# Patient Record
Sex: FEMALE | Race: WHITE | Employment: OTHER | URBAN - METROPOLITAN AREA
[De-identification: names, ages, dates, MRNs, and addresses within clinical notes are randomized per-mention and may not be internally consistent; named-entity substitution may affect disease eponyms.]

---

## 2017-01-01 ENCOUNTER — HOSPITAL ENCOUNTER (OUTPATIENT)
Dept: OTHER | Age: 77
Discharge: HOME | End: 2017-01-31
Attending: INTERNAL MEDICINE | Admitting: INTERNAL MEDICINE

## 2017-01-03 ENCOUNTER — TELEPHONE (OUTPATIENT)
Dept: CARDIOLOGY CLINIC | Age: 77
End: 2017-01-03

## 2017-01-03 ENCOUNTER — PROCEDURE VISIT (OUTPATIENT)
Dept: CARDIOLOGY CLINIC | Age: 77
End: 2017-01-03

## 2017-01-03 DIAGNOSIS — Z95.0 CARDIAC PACEMAKER IN SITU: Primary | ICD-10-CM

## 2017-01-03 PROCEDURE — 93296 REM INTERROG EVL PM/IDS: CPT | Performed by: INTERNAL MEDICINE

## 2017-01-03 PROCEDURE — 93294 REM INTERROG EVL PM/LDLS PM: CPT | Performed by: INTERNAL MEDICINE

## 2017-01-09 LAB
POC INR: 2.7 INDEX
PROTHROMBIN TIME, POC: 32.3 SECONDS (ref 10–14.3)

## 2017-02-01 ENCOUNTER — HOSPITAL ENCOUNTER (OUTPATIENT)
Dept: OTHER | Age: 77
Discharge: HOME | End: 2017-02-28
Attending: INTERNAL MEDICINE | Admitting: INTERNAL MEDICINE

## 2017-02-16 LAB
POC INR: 2.6 INDEX
PROTHROMBIN TIME, POC: 31.4 SECONDS (ref 10–14.3)

## 2017-03-01 ENCOUNTER — HOSPITAL ENCOUNTER (OUTPATIENT)
Dept: OTHER | Age: 77
Discharge: HOME | End: 2017-03-31
Attending: INTERNAL MEDICINE | Admitting: INTERNAL MEDICINE

## 2017-03-14 ENCOUNTER — TELEPHONE (OUTPATIENT)
Dept: CARDIOLOGY CLINIC | Age: 77
End: 2017-03-14

## 2017-03-22 RX ORDER — DIGOXIN 125 MCG
125 TABLET ORAL DAILY
Qty: 90 TABLET | Refills: 3 | Status: SHIPPED | OUTPATIENT
Start: 2017-03-22 | End: 2018-03-21 | Stop reason: SDUPTHER

## 2017-03-27 LAB
POC INR: 2.4 INDEX
PROTHROMBIN TIME, POC: 28.3 SECONDS (ref 10–14.3)

## 2017-03-29 ENCOUNTER — OFFICE VISIT (OUTPATIENT)
Dept: CARDIOLOGY CLINIC | Age: 77
End: 2017-03-29

## 2017-03-29 VITALS
BODY MASS INDEX: 19.19 KG/M2 | WEIGHT: 115.2 LBS | HEART RATE: 66 BPM | HEIGHT: 65 IN | SYSTOLIC BLOOD PRESSURE: 128 MMHG | DIASTOLIC BLOOD PRESSURE: 62 MMHG

## 2017-03-29 DIAGNOSIS — E78.5 HYPERLIPIDEMIA, UNSPECIFIED HYPERLIPIDEMIA TYPE: ICD-10-CM

## 2017-03-29 DIAGNOSIS — Z72.0 TOBACCO ABUSE: ICD-10-CM

## 2017-03-29 DIAGNOSIS — Z95.0 CARDIAC PACEMAKER IN SITU: ICD-10-CM

## 2017-03-29 DIAGNOSIS — I48.0 PAF (PAROXYSMAL ATRIAL FIBRILLATION) (HCC): ICD-10-CM

## 2017-03-29 DIAGNOSIS — I10 ESSENTIAL HYPERTENSION: ICD-10-CM

## 2017-03-29 DIAGNOSIS — I38 VHD (VALVULAR HEART DISEASE): Primary | ICD-10-CM

## 2017-03-29 PROCEDURE — 99214 OFFICE O/P EST MOD 30 MIN: CPT | Performed by: INTERNAL MEDICINE

## 2017-04-01 ENCOUNTER — HOSPITAL ENCOUNTER (OUTPATIENT)
Dept: OTHER | Age: 77
Discharge: HOME | End: 2017-04-30
Attending: INTERNAL MEDICINE | Admitting: INTERNAL MEDICINE

## 2017-04-04 ENCOUNTER — PROCEDURE VISIT (OUTPATIENT)
Dept: CARDIOLOGY CLINIC | Age: 77
End: 2017-04-04

## 2017-04-04 DIAGNOSIS — E78.5 HYPERLIPIDEMIA, UNSPECIFIED HYPERLIPIDEMIA TYPE: ICD-10-CM

## 2017-04-04 DIAGNOSIS — I48.0 PAF (PAROXYSMAL ATRIAL FIBRILLATION) (HCC): Primary | ICD-10-CM

## 2017-04-04 DIAGNOSIS — Z95.0 CARDIAC PACEMAKER IN SITU: ICD-10-CM

## 2017-04-04 DIAGNOSIS — I10 ESSENTIAL HYPERTENSION: ICD-10-CM

## 2017-04-04 DIAGNOSIS — Z72.0 TOBACCO ABUSE: ICD-10-CM

## 2017-04-04 DIAGNOSIS — I38 VHD (VALVULAR HEART DISEASE): ICD-10-CM

## 2017-04-04 LAB
LV EF: 53 %
LVEF MODALITY: NORMAL

## 2017-04-04 PROCEDURE — 93306 TTE W/DOPPLER COMPLETE: CPT | Performed by: INTERNAL MEDICINE

## 2017-04-07 ENCOUNTER — TELEPHONE (OUTPATIENT)
Dept: CARDIOLOGY CLINIC | Age: 77
End: 2017-04-07

## 2017-04-11 ENCOUNTER — PROCEDURE VISIT (OUTPATIENT)
Dept: CARDIOLOGY CLINIC | Age: 77
End: 2017-04-11

## 2017-04-11 DIAGNOSIS — Z95.0 CARDIAC PACEMAKER IN SITU: Primary | ICD-10-CM

## 2017-04-11 PROCEDURE — 93296 REM INTERROG EVL PM/IDS: CPT | Performed by: INTERNAL MEDICINE

## 2017-04-11 PROCEDURE — 93294 REM INTERROG EVL PM/LDLS PM: CPT | Performed by: INTERNAL MEDICINE

## 2017-04-17 RX ORDER — METOPROLOL TARTRATE 50 MG/1
50 TABLET, FILM COATED ORAL 2 TIMES DAILY
Qty: 180 TABLET | Refills: 3 | Status: SHIPPED | OUTPATIENT
Start: 2017-04-17 | End: 2018-04-23 | Stop reason: SDUPTHER

## 2017-04-27 LAB
POC INR: 2.8 INDEX
PROTHROMBIN TIME, POC: 33.5 SECONDS (ref 10–14.3)

## 2017-05-01 ENCOUNTER — HOSPITAL ENCOUNTER (OUTPATIENT)
Dept: OTHER | Age: 77
Discharge: HOME | End: 2017-05-31
Attending: INTERNAL MEDICINE | Admitting: INTERNAL MEDICINE

## 2017-05-25 LAB
POC INR: 2.6 INDEX
PROTHROMBIN TIME, POC: 31.3 SECONDS (ref 10–14.3)

## 2017-06-01 ENCOUNTER — HOSPITAL ENCOUNTER (OUTPATIENT)
Dept: OTHER | Age: 77
Discharge: HOME | End: 2017-06-30
Attending: INTERNAL MEDICINE | Admitting: INTERNAL MEDICINE

## 2017-06-22 LAB
POC INR: 2.4 INDEX
PROTHROMBIN TIME, POC: 28.2 SECONDS (ref 10–14.3)

## 2017-07-01 ENCOUNTER — HOSPITAL ENCOUNTER (OUTPATIENT)
Dept: OTHER | Age: 77
Discharge: HOME | End: 2017-07-31
Attending: INTERNAL MEDICINE | Admitting: INTERNAL MEDICINE

## 2017-07-17 ENCOUNTER — TELEPHONE (OUTPATIENT)
Dept: CARDIOLOGY CLINIC | Age: 77
End: 2017-07-17

## 2017-07-18 ENCOUNTER — PROCEDURE VISIT (OUTPATIENT)
Dept: CARDIOLOGY CLINIC | Age: 77
End: 2017-07-18

## 2017-07-18 DIAGNOSIS — Z95.0 CARDIAC PACEMAKER IN SITU: Primary | ICD-10-CM

## 2017-07-18 PROCEDURE — 93296 REM INTERROG EVL PM/IDS: CPT | Performed by: INTERNAL MEDICINE

## 2017-07-18 PROCEDURE — 93294 REM INTERROG EVL PM/LDLS PM: CPT | Performed by: INTERNAL MEDICINE

## 2017-07-20 LAB
POC INR: 2.2 INDEX
PROTHROMBIN TIME, POC: 26.5 SECONDS (ref 10–14.3)

## 2017-08-01 ENCOUNTER — HOSPITAL ENCOUNTER (OUTPATIENT)
Dept: OTHER | Age: 77
Discharge: HOME | End: 2017-08-31
Attending: INTERNAL MEDICINE | Admitting: INTERNAL MEDICINE

## 2017-08-17 LAB
POC INR: 3.2 INDEX
PROTHROMBIN TIME, POC: 38 SECONDS (ref 10–14.3)

## 2017-09-01 ENCOUNTER — HOSPITAL ENCOUNTER (OUTPATIENT)
Dept: OTHER | Age: 77
Discharge: HOME | End: 2017-09-30
Attending: INTERNAL MEDICINE | Admitting: INTERNAL MEDICINE

## 2017-09-14 LAB
POC INR: 3.4 INDEX
PROTHROMBIN TIME, POC: 41.1 SECONDS (ref 10–14.3)

## 2017-09-18 ENCOUNTER — HOSPITAL ENCOUNTER (OUTPATIENT)
Dept: GENERAL RADIOLOGY | Age: 77
Discharge: HOME | End: 2017-09-18
Attending: INTERNAL MEDICINE | Admitting: INTERNAL MEDICINE

## 2017-09-18 LAB
CHOLESTEROL: 204 MG/DL
HDLC SERPL-MCNC: 48 MG/DL
LDL CHOLESTEROL DIRECT: 140 MG/DL
TRIGL SERPL-MCNC: 94 MG/DL

## 2017-09-27 ENCOUNTER — OFFICE VISIT (OUTPATIENT)
Dept: CARDIOLOGY CLINIC | Age: 77
End: 2017-09-27

## 2017-09-27 VITALS
OXYGEN SATURATION: 98 % | HEIGHT: 65 IN | BODY MASS INDEX: 18.49 KG/M2 | SYSTOLIC BLOOD PRESSURE: 156 MMHG | WEIGHT: 111 LBS | HEART RATE: 70 BPM | DIASTOLIC BLOOD PRESSURE: 74 MMHG

## 2017-09-27 DIAGNOSIS — Z95.0 CARDIAC PACEMAKER IN SITU: Primary | ICD-10-CM

## 2017-09-27 DIAGNOSIS — Z78.9 STATIN INTOLERANCE: ICD-10-CM

## 2017-09-27 DIAGNOSIS — I38 VHD (VALVULAR HEART DISEASE): ICD-10-CM

## 2017-09-27 DIAGNOSIS — I48.0 PAF (PAROXYSMAL ATRIAL FIBRILLATION) (HCC): ICD-10-CM

## 2017-09-27 DIAGNOSIS — I10 ESSENTIAL HYPERTENSION: ICD-10-CM

## 2017-09-27 DIAGNOSIS — Z72.0 TOBACCO ABUSE: ICD-10-CM

## 2017-09-27 DIAGNOSIS — E78.5 HYPERLIPIDEMIA, UNSPECIFIED HYPERLIPIDEMIA TYPE: ICD-10-CM

## 2017-09-27 PROCEDURE — 99214 OFFICE O/P EST MOD 30 MIN: CPT | Performed by: INTERNAL MEDICINE

## 2017-09-27 RX ORDER — LISINOPRIL 20 MG/1
20 TABLET ORAL DAILY
Qty: 30 TABLET | Refills: 3 | Status: SHIPPED | OUTPATIENT
Start: 2017-09-27 | End: 2018-03-28 | Stop reason: DRUGHIGH

## 2017-10-01 ENCOUNTER — HOSPITAL ENCOUNTER (OUTPATIENT)
Dept: OTHER | Age: 77
Discharge: HOME | End: 2017-10-31
Attending: INTERNAL MEDICINE | Admitting: INTERNAL MEDICINE

## 2017-10-05 ENCOUNTER — HOSPITAL ENCOUNTER (OUTPATIENT)
Dept: GENERAL RADIOLOGY | Age: 77
Discharge: HOME | End: 2017-10-05
Attending: INTERNAL MEDICINE | Admitting: INTERNAL MEDICINE

## 2017-10-05 LAB
ALBUMIN SERPL-MCNC: 4.5 GM/DL (ref 3.4–5)
ALP BLD-CCNC: 75 IU/L (ref 40–129)
ALT SERPL-CCNC: 11 U/L (ref 10–40)
ANION GAP SERPL CALCULATED.3IONS-SCNC: 15 MMOL/L (ref 4–16)
AST SERPL-CCNC: 18 IU/L (ref 15–37)
BILIRUB SERPL-MCNC: 2 MG/DL (ref 0–1)
BUN BLDV-MCNC: 13 MG/DL (ref 6–23)
CALCIUM SERPL-MCNC: 9.6 MG/DL (ref 8.3–10.6)
CHLORIDE BLD-SCNC: 98 MMOL/L (ref 99–110)
CO2: 26 MMOL/L (ref 21–32)
CREAT SERPL-MCNC: 1 MG/DL (ref 0.6–1.1)
GFR AFRICAN AMERICAN: >60 ML/MIN/1.73M2
GFR NON-AFRICAN AMERICAN: 54 ML/MIN/1.73M2
GLUCOSE FASTING: 92 MG/DL (ref 70–99)
HCT VFR BLD CALC: 49 % (ref 37–47)
HEMOGLOBIN: 16.2 GM/DL (ref 12.5–16)
MCH RBC QN AUTO: 31.5 PG (ref 27–31)
MCHC RBC AUTO-ENTMCNC: 33.1 % (ref 32–36)
MCV RBC AUTO: 95.1 FL (ref 78–100)
PDW BLD-RTO: 14 % (ref 11.7–14.9)
PLATELET # BLD: 214 K/CU MM (ref 140–440)
PMV BLD AUTO: 10.4 FL (ref 7.5–11.1)
POC INR: 2.1 INDEX
POTASSIUM SERPL-SCNC: 4.6 MMOL/L (ref 3.5–5.1)
PROTHROMBIN TIME, POC: 25.5 SECONDS (ref 10–14.3)
RBC # BLD: 5.15 M/CU MM (ref 4.2–5.4)
SODIUM BLD-SCNC: 139 MMOL/L (ref 135–145)
TOTAL PROTEIN: 6.3 GM/DL (ref 6.4–8.2)
WBC # BLD: 7.5 K/CU MM (ref 4–10.5)

## 2017-10-23 ENCOUNTER — TELEPHONE (OUTPATIENT)
Dept: CARDIOLOGY CLINIC | Age: 77
End: 2017-10-23

## 2017-10-23 ENCOUNTER — PROCEDURE VISIT (OUTPATIENT)
Dept: CARDIOLOGY CLINIC | Age: 77
End: 2017-10-23

## 2017-10-23 DIAGNOSIS — Z95.0 CARDIAC PACEMAKER IN SITU: Primary | ICD-10-CM

## 2017-10-23 PROCEDURE — 93294 REM INTERROG EVL PM/LDLS PM: CPT | Performed by: INTERNAL MEDICINE

## 2017-10-23 PROCEDURE — 93296 REM INTERROG EVL PM/IDS: CPT | Performed by: INTERNAL MEDICINE

## 2017-10-23 NOTE — LETTER
Cardiology [address] [place] [address]  Phone: [phone]  Fax: [phone]            October 23, 2017    [place] [id number]      Dear Demetria Vicente: This is your CARELINK schedule. Please steff your calendar with these dates. You can do your checks anytime during the scheduled day. Since we do not do reminder calls, it will be your responsibility to perform the checks on the day it is scheduled. If you have any questions or concerns, please call and ask for Maame Willingham at [phone].

## 2017-11-01 ENCOUNTER — HOSPITAL ENCOUNTER (OUTPATIENT)
Dept: OTHER | Age: 77
Discharge: HOME | End: 2017-11-30
Attending: INTERNAL MEDICINE | Admitting: INTERNAL MEDICINE

## 2017-11-06 LAB
POC INR: 2.3 INDEX
PROTHROMBIN TIME, POC: 27.5 SECONDS (ref 10–14.3)

## 2017-12-01 ENCOUNTER — HOSPITAL ENCOUNTER (OUTPATIENT)
Dept: OTHER | Age: 77
Discharge: HOME | End: 2017-12-31
Attending: INTERNAL MEDICINE | Admitting: INTERNAL MEDICINE

## 2017-12-11 LAB
POC INR: 2.4 INDEX
PROTHROMBIN TIME, POC: 28.9 SECONDS (ref 10–14.3)

## 2018-01-01 ENCOUNTER — HOSPITAL ENCOUNTER (OUTPATIENT)
Dept: OTHER | Age: 78
Discharge: HOME | End: 2018-01-31
Attending: INTERNAL MEDICINE | Admitting: INTERNAL MEDICINE

## 2018-01-11 LAB
POC INR: 2.3 INDEX
PROTHROMBIN TIME, POC: 28.2 SECONDS (ref 10–14.3)

## 2018-01-30 ENCOUNTER — PROCEDURE VISIT (OUTPATIENT)
Dept: CARDIOLOGY CLINIC | Age: 78
End: 2018-01-30

## 2018-01-30 DIAGNOSIS — I38 VHD (VALVULAR HEART DISEASE): ICD-10-CM

## 2018-01-30 DIAGNOSIS — Z95.0 CARDIAC PACEMAKER IN SITU: Primary | ICD-10-CM

## 2018-01-30 PROCEDURE — 93294 REM INTERROG EVL PM/LDLS PM: CPT | Performed by: INTERNAL MEDICINE

## 2018-01-30 PROCEDURE — 93296 REM INTERROG EVL PM/IDS: CPT | Performed by: INTERNAL MEDICINE

## 2018-02-01 ENCOUNTER — HOSPITAL ENCOUNTER (OUTPATIENT)
Dept: OTHER | Age: 78
Discharge: HOME | End: 2018-02-28
Attending: INTERNAL MEDICINE | Admitting: INTERNAL MEDICINE

## 2018-02-08 LAB
POC INR: 2.6 INDEX
PROTHROMBIN TIME, POC: 31.6 SECONDS (ref 10–14.3)

## 2018-03-01 ENCOUNTER — HOSPITAL ENCOUNTER (OUTPATIENT)
Dept: OTHER | Age: 78
Discharge: HOME | End: 2018-03-31
Attending: INTERNAL MEDICINE | Admitting: INTERNAL MEDICINE

## 2018-03-12 LAB
POC INR: 2.3 INDEX
PROTHROMBIN TIME, POC: 27.7 SECONDS (ref 10–14.3)

## 2018-03-21 RX ORDER — DIGOXIN 125 MCG
125 TABLET ORAL DAILY
Qty: 90 TABLET | Refills: 3 | Status: SHIPPED | OUTPATIENT
Start: 2018-03-21 | End: 2019-03-25 | Stop reason: SDUPTHER

## 2018-03-28 ENCOUNTER — OFFICE VISIT (OUTPATIENT)
Dept: CARDIOLOGY CLINIC | Age: 78
End: 2018-03-28

## 2018-03-28 VITALS
SYSTOLIC BLOOD PRESSURE: 138 MMHG | DIASTOLIC BLOOD PRESSURE: 68 MMHG | BODY MASS INDEX: 17.83 KG/M2 | HEART RATE: 68 BPM | WEIGHT: 107 LBS | HEIGHT: 65 IN

## 2018-03-28 DIAGNOSIS — I48.21 PERMANENT ATRIAL FIBRILLATION (HCC): Primary | ICD-10-CM

## 2018-03-28 DIAGNOSIS — I38 VHD (VALVULAR HEART DISEASE): ICD-10-CM

## 2018-03-28 DIAGNOSIS — Z72.0 TOBACCO ABUSE: ICD-10-CM

## 2018-03-28 DIAGNOSIS — Z78.9 STATIN INTOLERANCE: ICD-10-CM

## 2018-03-28 DIAGNOSIS — Z95.0 CARDIAC PACEMAKER IN SITU: ICD-10-CM

## 2018-03-28 DIAGNOSIS — E78.5 HYPERLIPIDEMIA, UNSPECIFIED HYPERLIPIDEMIA TYPE: ICD-10-CM

## 2018-03-28 DIAGNOSIS — I10 ESSENTIAL HYPERTENSION: ICD-10-CM

## 2018-03-28 PROCEDURE — G8598 ASA/ANTIPLAT THER USED: HCPCS | Performed by: INTERNAL MEDICINE

## 2018-03-28 PROCEDURE — 4004F PT TOBACCO SCREEN RCVD TLK: CPT | Performed by: INTERNAL MEDICINE

## 2018-03-28 PROCEDURE — 1090F PRES/ABSN URINE INCON ASSESS: CPT | Performed by: INTERNAL MEDICINE

## 2018-03-28 PROCEDURE — 99214 OFFICE O/P EST MOD 30 MIN: CPT | Performed by: INTERNAL MEDICINE

## 2018-03-28 PROCEDURE — G8484 FLU IMMUNIZE NO ADMIN: HCPCS | Performed by: INTERNAL MEDICINE

## 2018-03-28 PROCEDURE — 4040F PNEUMOC VAC/ADMIN/RCVD: CPT | Performed by: INTERNAL MEDICINE

## 2018-03-28 PROCEDURE — G8419 CALC BMI OUT NRM PARAM NOF/U: HCPCS | Performed by: INTERNAL MEDICINE

## 2018-03-28 PROCEDURE — 1123F ACP DISCUSS/DSCN MKR DOCD: CPT | Performed by: INTERNAL MEDICINE

## 2018-03-28 PROCEDURE — G8427 DOCREV CUR MEDS BY ELIG CLIN: HCPCS | Performed by: INTERNAL MEDICINE

## 2018-03-28 PROCEDURE — G8400 PT W/DXA NO RESULTS DOC: HCPCS | Performed by: INTERNAL MEDICINE

## 2018-03-28 RX ORDER — LISINOPRIL 40 MG/1
40 TABLET ORAL DAILY
COMMUNITY
End: 2019-06-13 | Stop reason: ALTCHOICE

## 2018-04-01 ENCOUNTER — HOSPITAL ENCOUNTER (OUTPATIENT)
Dept: OTHER | Age: 78
Discharge: HOME | End: 2018-04-30
Attending: INTERNAL MEDICINE | Admitting: INTERNAL MEDICINE

## 2018-04-12 LAB
POC INR: 2.5 INDEX
PROTHROMBIN TIME, POC: 29.8 SECONDS (ref 10–14.3)

## 2018-04-26 RX ORDER — METOPROLOL TARTRATE 50 MG/1
50 TABLET, FILM COATED ORAL 2 TIMES DAILY
Qty: 180 TABLET | Refills: 3 | Status: SHIPPED | OUTPATIENT
Start: 2018-04-26 | End: 2019-05-01 | Stop reason: SDUPTHER

## 2018-05-01 ENCOUNTER — HOSPITAL ENCOUNTER (OUTPATIENT)
Dept: OTHER | Age: 78
Discharge: HOME | End: 2018-05-31
Attending: INTERNAL MEDICINE | Admitting: INTERNAL MEDICINE

## 2018-05-07 ENCOUNTER — PROCEDURE VISIT (OUTPATIENT)
Dept: CARDIOLOGY CLINIC | Age: 78
End: 2018-05-07

## 2018-05-07 DIAGNOSIS — I38 VHD (VALVULAR HEART DISEASE): ICD-10-CM

## 2018-05-07 DIAGNOSIS — Z95.0 CARDIAC PACEMAKER IN SITU: Primary | ICD-10-CM

## 2018-05-07 PROCEDURE — 93296 REM INTERROG EVL PM/IDS: CPT | Performed by: INTERNAL MEDICINE

## 2018-05-07 PROCEDURE — 93294 REM INTERROG EVL PM/LDLS PM: CPT | Performed by: INTERNAL MEDICINE

## 2018-05-10 LAB
POC INR: 2.6 INDEX
PROTHROMBIN TIME, POC: 31.6 SECONDS (ref 10–14.3)

## 2018-06-01 ENCOUNTER — HOSPITAL ENCOUNTER (OUTPATIENT)
Dept: OTHER | Age: 78
Discharge: HOME | End: 2018-06-30
Attending: INTERNAL MEDICINE | Admitting: INTERNAL MEDICINE

## 2018-06-07 LAB
POC INR: 3.6 INDEX
PROTHROMBIN TIME, POC: 42.8 SECONDS (ref 10–14.3)

## 2018-06-26 ENCOUNTER — TELEPHONE (OUTPATIENT)
Dept: CARDIOLOGY CLINIC | Age: 78
End: 2018-06-26

## 2018-07-01 ENCOUNTER — HOSPITAL ENCOUNTER (OUTPATIENT)
Dept: GENERAL RADIOLOGY | Age: 78
Discharge: HOME | End: 2018-07-31
Attending: INTERNAL MEDICINE | Admitting: INTERNAL MEDICINE

## 2018-07-05 LAB
POC INR: 2.8 INDEX
PROTHROMBIN TIME, POC: 33.8 SECONDS (ref 10–14.3)

## 2018-08-01 ENCOUNTER — HOSPITAL ENCOUNTER (OUTPATIENT)
Dept: GENERAL RADIOLOGY | Age: 78
Discharge: HOME | End: 2018-08-31
Attending: INTERNAL MEDICINE | Admitting: INTERNAL MEDICINE

## 2018-08-02 LAB
POC INR: 2.3 INDEX
PROTHROMBIN TIME, POC: 27.4 SECONDS (ref 10–14.3)

## 2018-08-13 ENCOUNTER — PROCEDURE VISIT (OUTPATIENT)
Dept: CARDIOLOGY CLINIC | Age: 78
End: 2018-08-13

## 2018-08-13 ENCOUNTER — TELEPHONE (OUTPATIENT)
Dept: CARDIOLOGY CLINIC | Age: 78
End: 2018-08-13

## 2018-08-13 DIAGNOSIS — Z95.0 CARDIAC PACEMAKER IN SITU: Primary | ICD-10-CM

## 2018-08-13 DIAGNOSIS — I38 VHD (VALVULAR HEART DISEASE): ICD-10-CM

## 2018-08-13 PROCEDURE — 93294 REM INTERROG EVL PM/LDLS PM: CPT | Performed by: INTERNAL MEDICINE

## 2018-08-13 PROCEDURE — 93296 REM INTERROG EVL PM/IDS: CPT | Performed by: INTERNAL MEDICINE

## 2018-09-01 ENCOUNTER — HOSPITAL ENCOUNTER (OUTPATIENT)
Dept: GENERAL RADIOLOGY | Age: 78
Discharge: HOME | End: 2018-09-01
Attending: INTERNAL MEDICINE | Admitting: INTERNAL MEDICINE

## 2018-09-06 LAB
POC INR: 2.5 INDEX
PROTHROMBIN TIME, POC: 30 SECONDS (ref 10–14.3)

## 2018-10-04 ENCOUNTER — ANTI-COAG VISIT (OUTPATIENT)
Dept: PHARMACY | Age: 78
End: 2018-10-04
Payer: MEDICARE

## 2018-10-04 DIAGNOSIS — Z79.01 LONG TERM CURRENT USE OF ANTICOAGULANT THERAPY: ICD-10-CM

## 2018-10-04 LAB
INTERNATIONAL NORMALIZATION RATIO, POC: 2.3
INTERNATIONAL NORMALIZATION RATIO, POC: 2.3
POC INR: 2.3 INDEX
PROTHROMBIN TIME, POC: 27.1 SECONDS (ref 10–14.3)

## 2018-10-04 PROCEDURE — 36416 COLLJ CAPILLARY BLOOD SPEC: CPT

## 2018-10-04 PROCEDURE — 99211 OFF/OP EST MAY X REQ PHY/QHP: CPT

## 2018-10-04 PROCEDURE — 85610 PROTHROMBIN TIME: CPT

## 2018-10-12 ENCOUNTER — OFFICE VISIT (OUTPATIENT)
Dept: CARDIOLOGY CLINIC | Age: 78
End: 2018-10-12
Payer: MEDICARE

## 2018-10-12 VITALS
HEART RATE: 64 BPM | WEIGHT: 108.6 LBS | DIASTOLIC BLOOD PRESSURE: 82 MMHG | BODY MASS INDEX: 18.09 KG/M2 | SYSTOLIC BLOOD PRESSURE: 138 MMHG | HEIGHT: 65 IN

## 2018-10-12 DIAGNOSIS — I38 VHD (VALVULAR HEART DISEASE): ICD-10-CM

## 2018-10-12 DIAGNOSIS — Z95.0 PACEMAKER: ICD-10-CM

## 2018-10-12 DIAGNOSIS — I10 ESSENTIAL HYPERTENSION: Primary | ICD-10-CM

## 2018-10-12 DIAGNOSIS — E78.5 HYPERLIPIDEMIA, UNSPECIFIED HYPERLIPIDEMIA TYPE: ICD-10-CM

## 2018-10-12 PROCEDURE — 99213 OFFICE O/P EST LOW 20 MIN: CPT | Performed by: NURSE PRACTITIONER

## 2018-10-18 ENCOUNTER — TELEPHONE (OUTPATIENT)
Dept: PHARMACY | Age: 78
End: 2018-10-18

## 2018-10-18 RX ORDER — WARFARIN SODIUM 5 MG/1
TABLET ORAL
Qty: 35 TABLET | Refills: 5 | Status: SHIPPED | OUTPATIENT
Start: 2018-10-18 | End: 2018-11-01 | Stop reason: SDUPTHER

## 2018-11-01 ENCOUNTER — ANTI-COAG VISIT (OUTPATIENT)
Dept: PHARMACY | Age: 78
End: 2018-11-01
Payer: MEDICARE

## 2018-11-01 DIAGNOSIS — Z79.01 LONG TERM CURRENT USE OF ANTICOAGULANT THERAPY: ICD-10-CM

## 2018-11-01 LAB
INTERNATIONAL NORMALIZATION RATIO, POC: 3.1
POC INR: 3.1 INDEX
PROTHROMBIN TIME, POC: 37.1 SECONDS (ref 10–14.3)

## 2018-11-01 PROCEDURE — 36416 COLLJ CAPILLARY BLOOD SPEC: CPT

## 2018-11-01 PROCEDURE — 85610 PROTHROMBIN TIME: CPT

## 2018-11-01 PROCEDURE — 99212 OFFICE O/P EST SF 10 MIN: CPT

## 2018-11-01 RX ORDER — WARFARIN SODIUM 5 MG/1
TABLET ORAL
Qty: 100 TABLET | Refills: 1 | Status: SHIPPED | OUTPATIENT
Start: 2018-11-01 | End: 2019-02-07 | Stop reason: DRUGHIGH

## 2018-11-19 ENCOUNTER — PROCEDURE VISIT (OUTPATIENT)
Dept: CARDIOLOGY CLINIC | Age: 78
End: 2018-11-19
Payer: MEDICARE

## 2018-11-19 ENCOUNTER — TELEPHONE (OUTPATIENT)
Dept: CARDIOLOGY CLINIC | Age: 78
End: 2018-11-19

## 2018-11-19 DIAGNOSIS — Z95.0 CARDIAC PACEMAKER IN SITU: Primary | ICD-10-CM

## 2018-11-19 DIAGNOSIS — I38 VHD (VALVULAR HEART DISEASE): ICD-10-CM

## 2018-11-19 PROCEDURE — 93294 REM INTERROG EVL PM/LDLS PM: CPT | Performed by: INTERNAL MEDICINE

## 2018-11-19 PROCEDURE — 93296 REM INTERROG EVL PM/IDS: CPT | Performed by: INTERNAL MEDICINE

## 2018-11-30 DIAGNOSIS — I48.0 PAF (PAROXYSMAL ATRIAL FIBRILLATION) (HCC): ICD-10-CM

## 2018-11-30 DIAGNOSIS — I48.20 CHRONIC ATRIAL FIBRILLATION (HCC): Primary | ICD-10-CM

## 2018-11-30 DIAGNOSIS — G45.9 TIA (TRANSIENT ISCHEMIC ATTACK): ICD-10-CM

## 2018-11-30 DIAGNOSIS — I48.91 ATRIAL FIBRILLATION, UNSPECIFIED TYPE (HCC): ICD-10-CM

## 2018-12-06 ENCOUNTER — ANTI-COAG VISIT (OUTPATIENT)
Dept: PHARMACY | Age: 78
End: 2018-12-06
Payer: MEDICARE

## 2018-12-06 DIAGNOSIS — Z79.01 LONG TERM CURRENT USE OF ANTICOAGULANT THERAPY: ICD-10-CM

## 2018-12-06 LAB
INTERNATIONAL NORMALIZATION RATIO, POC: 3
POC INR: 3 INDEX
PROTHROMBIN TIME, POC: 36.2 SECONDS (ref 10–14.3)

## 2018-12-06 PROCEDURE — 36416 COLLJ CAPILLARY BLOOD SPEC: CPT

## 2018-12-06 PROCEDURE — 99211 OFF/OP EST MAY X REQ PHY/QHP: CPT

## 2018-12-06 PROCEDURE — 85610 PROTHROMBIN TIME: CPT

## 2019-01-03 ENCOUNTER — ANTI-COAG VISIT (OUTPATIENT)
Dept: PHARMACY | Age: 79
End: 2019-01-03
Payer: MEDICARE

## 2019-01-03 DIAGNOSIS — Z79.01 LONG TERM CURRENT USE OF ANTICOAGULANT THERAPY: ICD-10-CM

## 2019-01-03 DIAGNOSIS — I48.91 ATRIAL FIBRILLATION, UNSPECIFIED TYPE (HCC): ICD-10-CM

## 2019-01-03 LAB
INR BLD: 2.8
POC INR: 2.8 INDEX
PROTHROMBIN TIME, POC: 33.8 SECONDS (ref 10–14.3)
PROTIME: 33.8 SECONDS

## 2019-01-03 PROCEDURE — 99211 OFF/OP EST MAY X REQ PHY/QHP: CPT

## 2019-01-03 PROCEDURE — 36416 COLLJ CAPILLARY BLOOD SPEC: CPT

## 2019-01-03 PROCEDURE — 85610 PROTHROMBIN TIME: CPT

## 2019-02-07 ENCOUNTER — ANTI-COAG VISIT (OUTPATIENT)
Dept: PHARMACY | Age: 79
End: 2019-02-07
Payer: MEDICARE

## 2019-02-07 DIAGNOSIS — G45.9 TIA (TRANSIENT ISCHEMIC ATTACK): ICD-10-CM

## 2019-02-07 DIAGNOSIS — Z79.01 LONG TERM CURRENT USE OF ANTICOAGULANT THERAPY: ICD-10-CM

## 2019-02-07 DIAGNOSIS — I48.91 ATRIAL FIBRILLATION, UNSPECIFIED TYPE (HCC): ICD-10-CM

## 2019-02-07 DIAGNOSIS — I48.20 CHRONIC ATRIAL FIBRILLATION (HCC): ICD-10-CM

## 2019-02-07 LAB
INR BLD: 2.6
POC INR: 2.6 INDEX
PROTHROMBIN TIME, POC: 31.1 SECONDS (ref 10–14.3)
PROTIME: 31.1 SECONDS

## 2019-02-07 PROCEDURE — 85610 PROTHROMBIN TIME: CPT

## 2019-02-07 PROCEDURE — 36416 COLLJ CAPILLARY BLOOD SPEC: CPT

## 2019-02-07 PROCEDURE — 99211 OFF/OP EST MAY X REQ PHY/QHP: CPT

## 2019-02-07 RX ORDER — WARFARIN SODIUM 5 MG/1
5 TABLET ORAL DAILY
COMMUNITY
End: 2019-10-31 | Stop reason: SDUPTHER

## 2019-02-12 PROBLEM — I48.20 CHRONIC ATRIAL FIBRILLATION (HCC): Status: ACTIVE | Noted: 2019-02-12

## 2019-02-27 ENCOUNTER — PROCEDURE VISIT (OUTPATIENT)
Dept: CARDIOLOGY CLINIC | Age: 79
End: 2019-02-27
Payer: MEDICARE

## 2019-02-27 DIAGNOSIS — I38 VHD (VALVULAR HEART DISEASE): ICD-10-CM

## 2019-02-27 DIAGNOSIS — Z95.0 CARDIAC PACEMAKER IN SITU: Primary | ICD-10-CM

## 2019-02-27 PROCEDURE — 93296 REM INTERROG EVL PM/IDS: CPT | Performed by: INTERNAL MEDICINE

## 2019-02-27 PROCEDURE — 93294 REM INTERROG EVL PM/LDLS PM: CPT | Performed by: INTERNAL MEDICINE

## 2019-03-07 ENCOUNTER — ANTI-COAG VISIT (OUTPATIENT)
Dept: PHARMACY | Age: 79
End: 2019-03-07
Payer: MEDICARE

## 2019-03-07 DIAGNOSIS — G45.9 TIA (TRANSIENT ISCHEMIC ATTACK): ICD-10-CM

## 2019-03-07 DIAGNOSIS — I48.20 CHRONIC ATRIAL FIBRILLATION (HCC): ICD-10-CM

## 2019-03-07 LAB
INTERNATIONAL NORMALIZATION RATIO, POC: 2.6
POC INR: 2.6 INDEX
PROTHROMBIN TIME, POC: 31.5 SECONDS (ref 10–14.3)

## 2019-03-07 PROCEDURE — 99211 OFF/OP EST MAY X REQ PHY/QHP: CPT

## 2019-03-07 PROCEDURE — 36416 COLLJ CAPILLARY BLOOD SPEC: CPT

## 2019-03-07 PROCEDURE — 85610 PROTHROMBIN TIME: CPT

## 2019-03-25 RX ORDER — DIGOXIN 125 MCG
125 TABLET ORAL DAILY
Qty: 90 TABLET | Refills: 3 | Status: SHIPPED | OUTPATIENT
Start: 2019-03-25 | End: 2020-03-16

## 2019-04-04 ENCOUNTER — ANTI-COAG VISIT (OUTPATIENT)
Dept: PHARMACY | Age: 79
End: 2019-04-04
Payer: MEDICARE

## 2019-04-04 DIAGNOSIS — I48.20 CHRONIC ATRIAL FIBRILLATION (HCC): ICD-10-CM

## 2019-04-04 DIAGNOSIS — G45.9 TIA (TRANSIENT ISCHEMIC ATTACK): ICD-10-CM

## 2019-04-04 LAB
INTERNATIONAL NORMALIZATION RATIO, POC: 2.6
POC INR: 2.6 INDEX
POC INR: ABNORMAL INDEX
PROTHROMBIN TIME, POC: 31 SECONDS (ref 10–14.3)

## 2019-04-04 PROCEDURE — 36416 COLLJ CAPILLARY BLOOD SPEC: CPT

## 2019-04-04 PROCEDURE — 99211 OFF/OP EST MAY X REQ PHY/QHP: CPT

## 2019-04-04 PROCEDURE — 85610 PROTHROMBIN TIME: CPT

## 2019-04-04 NOTE — PROGRESS NOTES
Medication Management Service  [facility]  [phone]    Visit Date: 4/4/2019   Subjective:       Jo Ann Mendoza is a 69 y.o. female who presents to clinic today for anticoagulation monitoring and adjustment. Patient seen in clinic for warfarin management due to  Indication:   atrial fibrillation. INR goal: of 2.0-3.0. Duration of therapy: indefinite. Patient reports the following:   Adherent with regimen  Missed or extra doses:  None   Bleeding or thromboembolic side effects:  None  Significant medication changes:  None  Significant dietary changes: None  Significant alcohol or tobacco changes: None  Significant recent illness, disease state changes, or hospitalization:  None  Upcoming surgeries or procedures:  None  Falls: None           Assessment and Plan     PT/INR done in office per protocol. INR today is 2.6, therapeutic. Plan: Will continue current regimen of warfarin 5mg daily except 7.5mg on Tuesdays and Saturdays. Recheck INR in 4 week(s). Patient verbalized understanding of dosing directions and information discussed. Dosing schedule given to patient including phone number, appointment date, and time. Progress note sent to referring office. Patient acknowledges working in consult agreement with pharmacist as referred by his/her physician.       Electronically signed by Lorena Riley [address] [place] [address] on 4/4/19 at 2:44 PM

## 2019-04-16 ENCOUNTER — OFFICE VISIT (OUTPATIENT)
Dept: CARDIOLOGY CLINIC | Age: 79
End: 2019-04-16
Payer: MEDICARE

## 2019-04-16 ENCOUNTER — TELEPHONE (OUTPATIENT)
Dept: PHARMACY | Age: 79
End: 2019-04-16

## 2019-04-16 VITALS
HEIGHT: 65 IN | WEIGHT: 106.2 LBS | DIASTOLIC BLOOD PRESSURE: 80 MMHG | SYSTOLIC BLOOD PRESSURE: 128 MMHG | HEART RATE: 72 BPM | BODY MASS INDEX: 17.69 KG/M2

## 2019-04-16 DIAGNOSIS — Z72.0 TOBACCO ABUSE: ICD-10-CM

## 2019-04-16 DIAGNOSIS — E78.5 HYPERLIPIDEMIA, UNSPECIFIED HYPERLIPIDEMIA TYPE: ICD-10-CM

## 2019-04-16 DIAGNOSIS — I38 VHD (VALVULAR HEART DISEASE): ICD-10-CM

## 2019-04-16 DIAGNOSIS — I48.91 ATRIAL FIBRILLATION, UNSPECIFIED TYPE (HCC): ICD-10-CM

## 2019-04-16 DIAGNOSIS — I10 ESSENTIAL HYPERTENSION: ICD-10-CM

## 2019-04-16 DIAGNOSIS — Z95.0 PACEMAKER: ICD-10-CM

## 2019-04-16 DIAGNOSIS — I63.9 CEREBRAL INFARCTION, UNSPECIFIED MECHANISM (HCC): ICD-10-CM

## 2019-04-16 DIAGNOSIS — G45.9 TIA (TRANSIENT ISCHEMIC ATTACK): ICD-10-CM

## 2019-04-16 DIAGNOSIS — I48.0 PAF (PAROXYSMAL ATRIAL FIBRILLATION) (HCC): ICD-10-CM

## 2019-04-16 DIAGNOSIS — I48.20 CHRONIC ATRIAL FIBRILLATION (HCC): Primary | ICD-10-CM

## 2019-04-16 DIAGNOSIS — Z78.9 STATIN INTOLERANCE: ICD-10-CM

## 2019-04-16 PROCEDURE — G8419 CALC BMI OUT NRM PARAM NOF/U: HCPCS | Performed by: INTERNAL MEDICINE

## 2019-04-16 PROCEDURE — 4004F PT TOBACCO SCREEN RCVD TLK: CPT | Performed by: INTERNAL MEDICINE

## 2019-04-16 PROCEDURE — G8400 PT W/DXA NO RESULTS DOC: HCPCS | Performed by: INTERNAL MEDICINE

## 2019-04-16 PROCEDURE — 4040F PNEUMOC VAC/ADMIN/RCVD: CPT | Performed by: INTERNAL MEDICINE

## 2019-04-16 PROCEDURE — 1090F PRES/ABSN URINE INCON ASSESS: CPT | Performed by: INTERNAL MEDICINE

## 2019-04-16 PROCEDURE — G8427 DOCREV CUR MEDS BY ELIG CLIN: HCPCS | Performed by: INTERNAL MEDICINE

## 2019-04-16 PROCEDURE — 1123F ACP DISCUSS/DSCN MKR DOCD: CPT | Performed by: INTERNAL MEDICINE

## 2019-04-16 PROCEDURE — 99214 OFFICE O/P EST MOD 30 MIN: CPT | Performed by: INTERNAL MEDICINE

## 2019-04-16 PROCEDURE — G8598 ASA/ANTIPLAT THER USED: HCPCS | Performed by: INTERNAL MEDICINE

## 2019-04-16 RX ORDER — WARFARIN SODIUM 5 MG/1
TABLET ORAL
Qty: 105 TABLET | Refills: 1 | Status: SHIPPED | OUTPATIENT
Start: 2019-04-16 | End: 2019-10-31 | Stop reason: SDUPTHER

## 2019-04-16 NOTE — PROGRESS NOTES
CARDIOLOGY  NOTE    Chief Complaint: afib/ pace maker    HPI:   Divya Elmore is a 69y.o. year old who has history as noted below. .  She used to see Dr. Pancho Cartagena. She has history of atrial fibrillation and pacemaker. She denies any chest pain, shortness of breath or ankle swelling. Overall she continues to be active and independent although \"does not get out much\"  She is concerned about lower extremity bruises and skin changes. She is seeing a dermatologist.  She denies any ankle swelling after she was treated by dermatologist for skin disorder. She has no shortness of breath, although her excess tolerance is declining. She is a pacemaker which was interrogated February 2019 revealing battery life of 2 years. She does have history of permanent atrial fibrillation. Current Outpatient Medications   Medication Sig Dispense Refill    warfarin (COUMADIN) 5 MG tablet Take 5mg by mouth daily except 7.5mg Tuesdays and Saturdays or as directed. 105 tablet 1    digoxin (LANOXIN) 125 MCG tablet Take 1 tablet by mouth daily 90 tablet 3    warfarin (COUMADIN) 5 MG tablet Take 5 mg by mouth daily except 7.5mg Tuesdays and Saturdays      metoprolol tartrate (LOPRESSOR) 50 MG tablet Take 1 tablet by mouth 2 times daily 180 tablet 3    lisinopril (PRINIVIL;ZESTRIL) 40 MG tablet Take 40 mg by mouth daily       No current facility-administered medications for this visit. Allergies:   Simvastatin    Patient History:  Past Medical History:   Diagnosis Date    Atrial fibrillation (HCC)     **Coumadin Clinic follows PT/INRs,along w/prescribing Coumadin. **    H/O 24 hour EKG monitoring 08/08/2008    pt in atrial fib with varying ventricular rate    H/O cardiovascular stress test 07/24/2014    EF 64%,no significant change over previous study, no ischemia, no wall motion abnormality seen, normal perfusion pattern    H/O Doppler ultrasound 06/24/2012-carotidarterial duplex    emily. ICA stenosis of less than 50%, antegrade flow noted in the vertebral arteries bilaterally    H/O echocardiogram 10/25/2011    EF >55%, mild mitral and tricuspid regurg, normal LV systolic function    H/O echocardiogram 08/09/2016    LIMITED EF-55%    H/O transesophageal echocardiography (VEENA) for monitoring 06/25/2012    no evidence of atrial septal defect, patent foramen ovale or no thrombus, continue medical therapy    Hx of echocardiogram 7/14/2014    moderate mitral and tricuspid regurg, right ventricular systolic pressure elevated at 30-40mmhg, mild to moderate aortic regurg    Hx of echocardiogram 04/05/2017    EF50-55%,moderate aortic regurg,mild to moderate pulmonary HTN, moderate tricusid regurg    Hyperlipidemia     Tobacco abuse      Past Surgical History:   Procedure Laterality Date    APPENDECTOMY      BLADDER SURGERY      cyst removed    LUNG SURGERY  1994    collasped lung    PACEMAKER INSERTION  10/08/2008    medtronic    TUBAL LIGATION       Family History   Problem Relation Age of Onset    High Cholesterol Mother     Diabetes Father     Heart Disease Brother      Social History     Tobacco Use    Smoking status: Current Every Day Smoker     Packs/day: 0.50     Years: 55.00     Pack years: 27.50     Types: Cigarettes    Smokeless tobacco: Never Used    Tobacco comment: 5 or 6 cigarettes a day    Substance Use Topics    Alcohol use: No        Review of Systems:   · Constitutional: No Fever or Weight Loss   · Eyes: No Decreased Vision  · ENT: No Headaches, Hearing Loss or Vertigo  · Cardiovascular: as per note above   · Respiratory: No cough or wheezing and as per note above.    · Gastrointestinal: No abdominal pain, appetite loss, blood in stools, constipation, diarrhea or heartburn  · Genitourinary: No dysuria, trouble voiding, or hematuria  · Musculoskeletal:  None  · Integumentary: No rash or pruritis  · Neurological: No TIA or stroke symptoms  · Psychiatric: No anxiety or depression  · Endocrine: No malaise, fatigue or temperature intolerance  · Hematologic/Lymphatic: No bleeding problems, blood clots or swollen lymph nodes  · Allergic/Immunologic: No nasal congestion or hives    Objective:      Physical Exam:  /80   Pulse 72   Ht 5' 5\" (1.651 m)   Wt 106 lb 3.2 oz (48.2 kg)   BMI 17.67 kg/m²   Wt Readings from Last 3 Encounters:   04/16/19 106 lb 3.2 oz (48.2 kg)   10/12/18 108 lb 9.6 oz (49.3 kg)   03/28/18 107 lb (48.5 kg)     Body mass index is 17.67 kg/m². Vitals:    04/16/19 1104   BP: 128/80   Pulse: 72        General Appearance:  No distress, conversant  Constitutional:  Well developed, Well nourished, No acute distress, Non-toxic appearance. HENT:  Normocephalic, Atraumatic, Bilateral external ears normal, Oropharynx moist, No oral exudates, Nose normal. Neck- Normal range of motion, No tenderness, Supple, No stridor,no apical-carotid delay  Eyes:  PERRL, EOMI, Conjunctiva normal, No discharge. Respiratory:  Normal breath sounds, No respiratory distress, No wheezing, No chest tenderness. ,no use of accessory muscles, NO crackles  Cardiovascular: (PMI) apex non displaced,no lifts no thrills,S1 and S2 audible, No added heart sounds, No signs of ankle edema, or volume overload, No evidence of JVD, No crackles  GI:  Bowel sounds normal, Soft, No tenderness, No masses, No gross visceromegaly   :  No costovertebral angle tenderness   Musculoskeletal:  No edema, no tenderness, no deformities.  Back- no tenderness  Integument:  Bilateral lower extremity chronic Coumadin changes  Lymphatic:  No lymphadenopathy noted   Neurologic:  Alert & oriented x 3, CN 2-12 normal, normal motor function, normal sensory function, no focal deficits noted   Psychiatric:  Speech and behavior appropriate       Medical decision making and Data review:  DATA:  No results found for: TROPONINT  BNP:  No results found for: PROBNP  PT/INR:  No results found for: PTINR  No results found for: LABA1C  Lab Results   Component Value Date    CHOL 204 (H) 09/18/2017    TRIG 94 09/18/2017    HDL 48 09/18/2017    LDLCALC 125 (H) 10/23/2014    LDLDIRECT 140 (H) 09/18/2017     Lab Results   Component Value Date    ALT 11 10/05/2017    AST 18 10/05/2017     TSH: No results found for: TSH  Lab Results   Component Value Date    AST 18 10/05/2017    ALT 11 10/05/2017    BILIDIR 0.3 (H) 05/06/2011    BILITOT 2.0 (H) 10/05/2017    ALKPHOS 75 10/05/2017     No results found for: PROBNP  No results found for: LABA1C  Lab Results   Component Value Date    WBC 7.5 10/05/2017    HGB 16.2 (H) 10/05/2017    HCT 49.0 (H) 10/05/2017     10/05/2017     All labs, medications and tests reviewed by myself including data and history from outside source , patient and available family . Assessment & Plan:      1. Chronic atrial fibrillation ([address]    2. Hyperlipidemia, unspecified hyperlipidemia type    3. Essential hypertension    4. PAF (paroxysmal atrial fibrillation) (HCC)    5. Statin intolerance    6. TIA (transient ischemic attack)    7. Tobacco abuse    8. Atrial fibrillation, unspecified type ([address]    9. VHD (valvular heart disease)    10. Pacemaker    11. Cerebral infarction, unspecified mechanism (HCC)         PAF (paroxysmal atrial fibrillation) ([address]  She has been anticoagulation with Coumadin for many years. We talked about DOACS either xarelto or eliquis and the benefits over Coumadin including but there is profile and improved  Efficacy over Coumadin. Chey Mcgowan She will think about it and get back to me whether she wants to switch over    VHD (valvular heart disease)  She has moderate aortic regurgitation as per echo in 2017. We will repeat echo in 2020. She also has mild mitral regurgitation and moderate tricuspid regurgitation.   She does not have any signs of heart failure or volume overload    Pacemaker  Pacemaker was interrogated in February results were reviewed with patient    Cerebral infarction [facility])  History

## 2019-04-16 NOTE — ASSESSMENT & PLAN NOTE
She has been anticoagulation with Coumadin for many years. We talked about DOACS either xarelto or eliquis and the benefits over Coumadin including but there is profile and improved  Efficacy over Coumadin. Kailyn Broach   She will think about it and get back to me whether she wants to switch over

## 2019-04-16 NOTE — PROGRESS NOTES
[id number] Score for Atrial Fibrillation Stroke Risk   Risk   Factors  Component Value   C CHF No 0   H HTN Yes 1   A2 Age >= 76 Yes,  (80 y.o.) 2   D DM No 0   S2 Prior Stroke/TIA Yes 2   V Vascular Disease No 0   A Age 74-69 No,  (80 y.o.) 0   Sc Sex female 1    [id number]  Score  6   Score last updated 9/44/35 62:06 AM    Click here for a link to the UpToDate guideline \"Atrial Fibrillation: Anticoagulation therapy to prevent embolization    Disclaimer: Risk Score calculation is dependent on accuracy of patient problem list and past encounter diagnosis.

## 2019-05-01 RX ORDER — METOPROLOL TARTRATE 50 MG/1
50 TABLET, FILM COATED ORAL 2 TIMES DAILY
Qty: 180 TABLET | Refills: 3 | Status: SHIPPED | OUTPATIENT
Start: 2019-05-01 | End: 2020-05-08 | Stop reason: SDUPTHER

## 2019-05-02 ENCOUNTER — ANTI-COAG VISIT (OUTPATIENT)
Dept: PHARMACY | Age: 79
End: 2019-05-02
Payer: MEDICARE

## 2019-05-02 DIAGNOSIS — I48.20 CHRONIC ATRIAL FIBRILLATION (HCC): ICD-10-CM

## 2019-05-02 DIAGNOSIS — G45.9 TIA (TRANSIENT ISCHEMIC ATTACK): ICD-10-CM

## 2019-05-02 LAB
INTERNATIONAL NORMALIZATION RATIO, POC: 2.6
POC INR: 2.6 INDEX
POC INR: ABNORMAL INDEX
PROTHROMBIN TIME, POC: 31.2 SECONDS (ref 10–14.3)

## 2019-05-02 PROCEDURE — 85610 PROTHROMBIN TIME: CPT

## 2019-05-02 PROCEDURE — 36416 COLLJ CAPILLARY BLOOD SPEC: CPT

## 2019-05-02 PROCEDURE — 99211 OFF/OP EST MAY X REQ PHY/QHP: CPT

## 2019-06-03 ENCOUNTER — TELEPHONE (OUTPATIENT)
Dept: CARDIOLOGY CLINIC | Age: 79
End: 2019-06-03

## 2019-06-13 ENCOUNTER — ANTI-COAG VISIT (OUTPATIENT)
Dept: PHARMACY | Age: 79
End: 2019-06-13
Payer: MEDICARE

## 2019-06-13 DIAGNOSIS — G45.9 TIA (TRANSIENT ISCHEMIC ATTACK): ICD-10-CM

## 2019-06-13 DIAGNOSIS — I48.20 CHRONIC ATRIAL FIBRILLATION (HCC): ICD-10-CM

## 2019-06-13 LAB
INTERNATIONAL NORMALIZATION RATIO, POC: 2.4
POC INR: 2.4 INDEX
POC INR: ABNORMAL INDEX
PROTHROMBIN TIME, POC: 28.5 SECONDS (ref 10–14.3)

## 2019-06-13 PROCEDURE — 99211 OFF/OP EST MAY X REQ PHY/QHP: CPT

## 2019-06-13 PROCEDURE — 36416 COLLJ CAPILLARY BLOOD SPEC: CPT

## 2019-06-13 PROCEDURE — 85610 PROTHROMBIN TIME: CPT

## 2019-06-13 RX ORDER — VALSARTAN 320 MG/1
320 TABLET ORAL DAILY
COMMUNITY
End: 2021-05-04 | Stop reason: SDUPTHER

## 2019-06-13 NOTE — PROGRESS NOTES
Medication Management Service  [facility]  [phone]    Visit Date: 6/13/2019   Subjective:       Patrick Alonso is a 69 y.o. female who presents to clinic today for anticoagulation monitoring and adjustment. Patient seen in clinic for warfarin management due to  Indication:   atrial fibrillation. INR goal: of 2.0-3.0. Duration of therapy: indefinite. Patient reports the following:   Adherent with regimen  Missed or extra doses:  None   Bleeding or thromboembolic side effects:  None  Significant medication changes:  Lisinopril changed to valsartan  Significant dietary changes: None  Significant alcohol or tobacco changes: None  Significant recent illness, disease state changes, or hospitalization:  None  Upcoming surgeries or procedures:  None  Falls: None           Assessment and Plan     PT/INR done in office per protocol. INR today is 2.4, therapeutic. Patient notes increased bruising on legs, possibly from increased work in garden. Plan: Will continue current regimen of warfarin 5mg daily except 7.5mg on Tuesdays and Saturdays. Recheck INR in 6 week(s). Patient verbalized understanding of dosing directions and information discussed. Dosing schedule given to patient including phone number, appointment date, and time. Progress note sent to referring office. Patient acknowledges working in consult agreement with pharmacist as referred by his/her physician.       Electronically signed by SANJANA Pierre [facility] on 6/13/19 at 11:32 AM

## 2019-07-16 ENCOUNTER — TELEPHONE (OUTPATIENT)
Dept: CARDIOLOGY CLINIC | Age: 79
End: 2019-07-16

## 2019-07-25 ENCOUNTER — ANTI-COAG VISIT (OUTPATIENT)
Dept: PHARMACY | Age: 79
End: 2019-07-25
Payer: MEDICARE

## 2019-07-25 DIAGNOSIS — I48.20 CHRONIC ATRIAL FIBRILLATION (HCC): ICD-10-CM

## 2019-07-25 DIAGNOSIS — G45.9 TIA (TRANSIENT ISCHEMIC ATTACK): ICD-10-CM

## 2019-07-25 LAB
INTERNATIONAL NORMALIZATION RATIO, POC: 2.3
POC INR: 2.3 INDEX
POC INR: ABNORMAL INDEX
PROTHROMBIN TIME, POC: 28.1 SECONDS (ref 10–14.3)

## 2019-07-25 PROCEDURE — 36416 COLLJ CAPILLARY BLOOD SPEC: CPT

## 2019-07-25 PROCEDURE — 99211 OFF/OP EST MAY X REQ PHY/QHP: CPT

## 2019-07-25 PROCEDURE — 85610 PROTHROMBIN TIME: CPT

## 2019-07-25 NOTE — PROGRESS NOTES
Medication Management Service  [facility]  [phone]    Visit Date: 7/25/2019   Subjective:       Carlos Eduardo Harman is a 69 y.o. female who presents to clinic today for anticoagulation monitoring and adjustment. Patient seen in clinic for warfarin management due to  Indication:   atrial fibrillation. INR goal: of 2.0-3.0. Duration of therapy: indefinite. Patient reports the following:   Adherent with regimen  Missed or extra doses:  None   Bleeding or thromboembolic side effects:  None  Significant medication changes:  None  Significant dietary changes: None  Significant alcohol or tobacco changes: None  Significant recent illness, disease state changes, or hospitalization:  None  Upcoming surgeries or procedures:  Bladder procedure- has not held warfarin prior to this procedure in the past  Falls: None           Assessment and Plan     PT/INR done in office per protocol. INR today is 2.3, therapeutic. Plan: Will continue current regimen of warfarin 5mg daily except 7.5mg on Tuesdays and Saturdays. Recheck INR in 6 week(s). Patient verbalized understanding of dosing directions and information discussed. Dosing schedule given to patient including phone number, appointment date, and time. Progress note sent to referring office. Patient acknowledges working in consult agreement with pharmacist as referred by his/her physician.       Electronically signed by Thedora Bloch, [facility] on 7/25/19 at 11:35 AM

## 2019-09-05 ENCOUNTER — ANTI-COAG VISIT (OUTPATIENT)
Dept: PHARMACY | Age: 79
End: 2019-09-05
Payer: MEDICARE

## 2019-09-05 DIAGNOSIS — I48.20 CHRONIC ATRIAL FIBRILLATION (HCC): ICD-10-CM

## 2019-09-05 DIAGNOSIS — G45.9 TIA (TRANSIENT ISCHEMIC ATTACK): ICD-10-CM

## 2019-09-05 LAB
INTERNATIONAL NORMALIZATION RATIO, POC: 1.5
POC INR: 1.5 INDEX
POC INR: ABNORMAL INDEX
PROTHROMBIN TIME, POC: 18.3 SECONDS (ref 10–14.3)

## 2019-09-05 PROCEDURE — 36416 COLLJ CAPILLARY BLOOD SPEC: CPT

## 2019-09-05 PROCEDURE — 85610 PROTHROMBIN TIME: CPT

## 2019-09-05 PROCEDURE — 99212 OFFICE O/P EST SF 10 MIN: CPT

## 2019-09-10 ENCOUNTER — PROCEDURE VISIT (OUTPATIENT)
Dept: CARDIOLOGY CLINIC | Age: 79
End: 2019-09-10
Payer: MEDICARE

## 2019-09-10 DIAGNOSIS — I38 VHD (VALVULAR HEART DISEASE): ICD-10-CM

## 2019-09-10 DIAGNOSIS — Z95.0 CARDIAC PACEMAKER IN SITU: Primary | ICD-10-CM

## 2019-09-12 PROCEDURE — 93294 REM INTERROG EVL PM/LDLS PM: CPT | Performed by: INTERNAL MEDICINE

## 2019-09-12 PROCEDURE — 93296 REM INTERROG EVL PM/IDS: CPT | Performed by: INTERNAL MEDICINE

## 2019-10-03 ENCOUNTER — ANTI-COAG VISIT (OUTPATIENT)
Dept: PHARMACY | Age: 79
End: 2019-10-03
Payer: MEDICARE

## 2019-10-03 DIAGNOSIS — I48.20 CHRONIC ATRIAL FIBRILLATION (HCC): ICD-10-CM

## 2019-10-03 DIAGNOSIS — G45.9 TIA (TRANSIENT ISCHEMIC ATTACK): ICD-10-CM

## 2019-10-03 LAB
INTERNATIONAL NORMALIZATION RATIO, POC: 3
POC INR: 3 INDEX
POC INR: ABNORMAL INDEX
PROTHROMBIN TIME, POC: 36.4 SECONDS (ref 10–14.3)

## 2019-10-03 PROCEDURE — 85610 PROTHROMBIN TIME: CPT

## 2019-10-03 PROCEDURE — 36416 COLLJ CAPILLARY BLOOD SPEC: CPT

## 2019-10-03 PROCEDURE — 99211 OFF/OP EST MAY X REQ PHY/QHP: CPT

## 2019-10-29 ENCOUNTER — OFFICE VISIT (OUTPATIENT)
Dept: CARDIOLOGY CLINIC | Age: 79
End: 2019-10-29
Payer: MEDICARE

## 2019-10-29 VITALS
SYSTOLIC BLOOD PRESSURE: 130 MMHG | HEART RATE: 68 BPM | DIASTOLIC BLOOD PRESSURE: 86 MMHG | BODY MASS INDEX: 18.59 KG/M2 | HEIGHT: 65 IN | WEIGHT: 111.6 LBS

## 2019-10-29 DIAGNOSIS — Z95.0 CARDIAC PACEMAKER IN SITU: ICD-10-CM

## 2019-10-29 DIAGNOSIS — I10 ESSENTIAL HYPERTENSION: ICD-10-CM

## 2019-10-29 DIAGNOSIS — E78.5 HYPERLIPIDEMIA, UNSPECIFIED HYPERLIPIDEMIA TYPE: ICD-10-CM

## 2019-10-29 DIAGNOSIS — I38 VHD (VALVULAR HEART DISEASE): ICD-10-CM

## 2019-10-29 DIAGNOSIS — I48.20 CHRONIC ATRIAL FIBRILLATION (HCC): Primary | ICD-10-CM

## 2019-10-29 PROCEDURE — 4004F PT TOBACCO SCREEN RCVD TLK: CPT | Performed by: NURSE PRACTITIONER

## 2019-10-29 PROCEDURE — 1090F PRES/ABSN URINE INCON ASSESS: CPT | Performed by: NURSE PRACTITIONER

## 2019-10-29 PROCEDURE — 1123F ACP DISCUSS/DSCN MKR DOCD: CPT | Performed by: NURSE PRACTITIONER

## 2019-10-29 PROCEDURE — G8427 DOCREV CUR MEDS BY ELIG CLIN: HCPCS | Performed by: NURSE PRACTITIONER

## 2019-10-29 PROCEDURE — G8400 PT W/DXA NO RESULTS DOC: HCPCS | Performed by: NURSE PRACTITIONER

## 2019-10-29 PROCEDURE — G8420 CALC BMI NORM PARAMETERS: HCPCS | Performed by: NURSE PRACTITIONER

## 2019-10-29 PROCEDURE — G8598 ASA/ANTIPLAT THER USED: HCPCS | Performed by: NURSE PRACTITIONER

## 2019-10-29 PROCEDURE — 4040F PNEUMOC VAC/ADMIN/RCVD: CPT | Performed by: NURSE PRACTITIONER

## 2019-10-29 PROCEDURE — G8484 FLU IMMUNIZE NO ADMIN: HCPCS | Performed by: NURSE PRACTITIONER

## 2019-10-29 PROCEDURE — 99214 OFFICE O/P EST MOD 30 MIN: CPT | Performed by: NURSE PRACTITIONER

## 2019-10-31 ENCOUNTER — ANTI-COAG VISIT (OUTPATIENT)
Dept: PHARMACY | Age: 79
End: 2019-10-31
Payer: MEDICARE

## 2019-10-31 DIAGNOSIS — G45.9 TIA (TRANSIENT ISCHEMIC ATTACK): ICD-10-CM

## 2019-10-31 DIAGNOSIS — I48.20 CHRONIC ATRIAL FIBRILLATION (HCC): ICD-10-CM

## 2019-10-31 LAB
INTERNATIONAL NORMALIZATION RATIO, POC: 3
POC INR: 3 INDEX
POC INR: ABNORMAL INDEX
PROTHROMBIN TIME, POC: 35.5 SECONDS (ref 10–14.3)

## 2019-10-31 PROCEDURE — 36416 COLLJ CAPILLARY BLOOD SPEC: CPT

## 2019-10-31 PROCEDURE — 99212 OFFICE O/P EST SF 10 MIN: CPT

## 2019-10-31 PROCEDURE — 85610 PROTHROMBIN TIME: CPT

## 2019-10-31 RX ORDER — WARFARIN SODIUM 5 MG/1
TABLET ORAL
Qty: 105 TABLET | Refills: 3 | Status: SHIPPED
Start: 2019-10-31 | End: 2020-08-13 | Stop reason: DRUGHIGH

## 2019-12-12 ENCOUNTER — ANTI-COAG VISIT (OUTPATIENT)
Dept: PHARMACY | Age: 79
End: 2019-12-12
Payer: MEDICARE

## 2019-12-12 DIAGNOSIS — I48.20 CHRONIC ATRIAL FIBRILLATION (HCC): ICD-10-CM

## 2019-12-12 DIAGNOSIS — G45.9 TIA (TRANSIENT ISCHEMIC ATTACK): ICD-10-CM

## 2019-12-12 LAB
INTERNATIONAL NORMALIZATION RATIO, POC: 2.8
POC INR: 2.8 INDEX
POC INR: ABNORMAL INDEX
PROTHROMBIN TIME, POC: 33.2 SECONDS (ref 10–14.3)

## 2019-12-12 PROCEDURE — 36416 COLLJ CAPILLARY BLOOD SPEC: CPT

## 2019-12-12 PROCEDURE — 99211 OFF/OP EST MAY X REQ PHY/QHP: CPT

## 2019-12-12 PROCEDURE — 85610 PROTHROMBIN TIME: CPT

## 2019-12-16 ENCOUNTER — TELEPHONE (OUTPATIENT)
Dept: CARDIOLOGY CLINIC | Age: 79
End: 2019-12-16

## 2019-12-16 ENCOUNTER — PROCEDURE VISIT (OUTPATIENT)
Dept: CARDIOLOGY CLINIC | Age: 79
End: 2019-12-16
Payer: MEDICARE

## 2019-12-16 DIAGNOSIS — I38 VHD (VALVULAR HEART DISEASE): ICD-10-CM

## 2019-12-16 DIAGNOSIS — Z95.0 CARDIAC PACEMAKER IN SITU: Primary | ICD-10-CM

## 2019-12-16 PROCEDURE — 93294 REM INTERROG EVL PM/LDLS PM: CPT | Performed by: INTERNAL MEDICINE

## 2019-12-16 PROCEDURE — 93296 REM INTERROG EVL PM/IDS: CPT | Performed by: INTERNAL MEDICINE

## 2020-01-15 ENCOUNTER — TELEPHONE (OUTPATIENT)
Dept: CARDIOLOGY CLINIC | Age: 80
End: 2020-01-15

## 2020-01-15 NOTE — TELEPHONE ENCOUNTER
Cardiologist: Dr. Lina Garcia  Surgeon: Dr. Beckie Bunchkeley  Surgery: Cystoscopy, TUR of Bladder Tumor  Anesthesia: General  Date: Pending  FAX# [phone]  # [phone]    Last OV 10/29/19 w/Ann-Marie    Assessment/ Plan:    Patient seen, interviewed and examined. Testing was reviewed.      HTN    Controlled, Continue current medications. :      Hyperlipidemia  No recent labs available    Atrial fib   SIVAKUMAR VASC of 6   Rate controlled yes. Rhythm is irregular    She is  on anticoagulation with coumadin  She is to continue  rate control medications      Echo 4/4/17   Left ventricle size is normal.   Mild left ventricular hypertrophy. Ejection fraction is visually estimated at 50-55%. No regional wall motion abnormalites. The left ac regurgitation is noted with a pressure half time of 413   msec. Sclerotic mitral valve   Mild mitral regurgitation is present. Tricuspid valve is structurally normal.   Moderate tricuspid regurgitation. Mild to moderate pulmonary hypertension. No evidence of pericardial effusion. Dilatation of the aortic root.     NM  7/24/14- Normal- EF 64%    MED: Coumadin

## 2020-01-16 ENCOUNTER — TELEPHONE (OUTPATIENT)
Dept: CARDIOLOGY CLINIC | Age: 80
End: 2020-01-16

## 2020-01-16 NOTE — LETTER
[facility]  [address], [address]  [facility], [address] [facility] [facility] [address]  Phone: [phone]    Fax [phone]                  Brody Sumner MD, Anne Officer, MD, Elizabeth Posey MD, MD Rey Wright MD Dub Eastern, MD Desiree Bough, SHAYAN-KELLI Clarke APRN-CNP Annabell Clare, APRN-CNP    Cardiac Risk Assessment   1/17/2020        Surgery Date: Pending  Surgery: Cystoscopy, TUR of Bladder tumor  Anesthesia Type: Steve Ache  Fax Number: [phone]    To: Dr. Delia Nelson  From : KELLI Tipton    Patient Name: Jorje Mohr     YOB: 1940    Jorje Mohr was evaluated from a cardiac standpoint for her surgery or procedure and based on her history, diagnosis, recent cardiac testing, she is considered a moderate risk candidate for any camila-operative cardiac complications. Patients with known CAD with moderate or high risk should have surgical procedures done where they have access to invasive cardiology services if emergently needed. Antiplatelet/anticoagulant therapy can be held prior to the surgery or procedure at the discretion of the surgeon to be resumed as soon as possible if held. Please call with any further questions.   Respectfully,     KELLI Tipton  TB/cjs

## 2020-01-16 NOTE — TELEPHONE ENCOUNTER
Revised Cardiac Risk Index:   High risk type of surgery no   H/O ischemic heart disease  (h/o MI, or a positive stress test, current complaint of chest pain considered to be secondary to myocardial ischemia,  Use of nitrate therapy or ECG with pathological Q waves; do not count prior coronary revascularization procedure unless one of the other criteria for ischemic heart disease is present) no     H/O heart failure no  H/O CVA yes   H/O DM treated with insulin no  Preoperative serum creatinine >2.0 mg/dl (177 micromol/L) not available     The calculated rate of cardiac death, nonfatal myocardial infarction, and nonfatal cardiac arrest according to the number of predictors is; Two risk factors  2.4% (95% CI: 1.3-3.5)     she is considered a moderate risk for surgery. Anticoagulation can be held prior to surgery at the discretion of the surgeon. Current recommendations are to hold 24-48 hours prior to surgery. It should be resumed as soon as possible if held.

## 2020-01-16 NOTE — TELEPHONE ENCOUNTER
Cardiologist: Dr. Kaci Gaitan  Surgeon: Dr. Dillan Mc  Surgery: Cystoscopy, TUR of Bladder Tumor  Anesthesia: General  Date: Pending  FAX# [phone]  # [phone]    Last OV 10/29/19 w/Ann-Marie    Assessment/ Plan:    Patient seen, interviewed and examined. Testing was reviewed.      HTN    Controlled, Continue current medications. :      Hyperlipidemia  No recent labs available    Atrial fib   SIVAKUMAR VASC of 6   Rate controlled yes. Rhythm is irregular    She is  on anticoagulation with coumadin  She is to continue  rate control medications      Echo 4/4/17   Left ventricle size is normal.   Mild left ventricular hypertrophy. Ejection fraction is visually estimated at 50-55%. No regional wall motion abnormalites. The left ac regurgitation is noted with a pressure half time of 413   msec. Sclerotic mitral valve   Mild mitral regurgitation is present. Tricuspid valve is structurally normal.   Moderate tricuspid regurgitation. Mild to moderate pulmonary hypertension. No evidence of pericardial effusion. Dilatation of the aortic root.     NM  7/24/14- Normal- EF 64%    MED: Coumadin

## 2020-01-23 ENCOUNTER — ANTI-COAG VISIT (OUTPATIENT)
Dept: PHARMACY | Age: 80
End: 2020-01-23
Payer: MEDICARE

## 2020-01-23 LAB
INTERNATIONAL NORMALIZATION RATIO, POC: 3.9
POC INR: 3.9 INDEX
POC INR: ABNORMAL INDEX
PROTHROMBIN TIME, POC: 46.8 SECONDS (ref 10–14.3)

## 2020-01-23 PROCEDURE — 99212 OFFICE O/P EST SF 10 MIN: CPT

## 2020-01-23 PROCEDURE — 36416 COLLJ CAPILLARY BLOOD SPEC: CPT

## 2020-01-23 PROCEDURE — 85610 PROTHROMBIN TIME: CPT

## 2020-01-28 ENCOUNTER — ANESTHESIA EVENT (OUTPATIENT)
Dept: OPERATING ROOM | Age: 80
End: 2020-01-28
Payer: MEDICARE

## 2020-01-28 NOTE — ANESTHESIA PRE PROCEDURE
Department of Anesthesiology  Preprocedure Note       Name:  Rashid Unger   Age:  67 y.o.  :  1940                                          MRN:  [id number]         Date:  2020      Surgeon: Christa Boast):  Mickle Cockayne, MD    Procedure: CYSTOSCOPY TRANSURETHRAL RESECTION BLADDER (N/A )    Medications prior to admission:   Prior to Admission medications    Medication Sig Start Date End Date Taking? Authorizing Provider   warfarin (COUMADIN) 5 MG tablet Take 1 tablet by mouth once daily except take 1 and 1/2 tablets by mouth on  and  or as directed 10/31/19   Otoniel Bustamante MD   valsartan (DIOVAN) 320 MG tablet Take 320 mg by mouth daily    Historical Provider, MD   metoprolol tartrate (LOPRESSOR) 50 MG tablet Take 1 tablet by mouth 2 times daily 19   Otoniel Bustamante MD   digoxin (LANOXIN) 125 MCG tablet Take 1 tablet by mouth daily 3/25/19   SHAYAN Elizalde - CNP       Current medications:    Current Outpatient Medications   Medication Sig Dispense Refill    warfarin (COUMADIN) 5 MG tablet Take 1 tablet by mouth once daily except take 1 and 1/2 tablets by mouth on  and  or as directed 105 tablet 3    valsartan (DIOVAN) 320 MG tablet Take 320 mg by mouth daily      metoprolol tartrate (LOPRESSOR) 50 MG tablet Take 1 tablet by mouth 2 times daily 180 tablet 3    digoxin (LANOXIN) 125 MCG tablet Take 1 tablet by mouth daily 90 tablet 3     No current facility-administered medications for this encounter. Allergies:     Allergies   Allergen Reactions    Simvastatin      Sick         Problem List:    Patient Active Problem List   Diagnosis Code    TIA (transient ischemic attack) G45.9    Cerebral infarction ([facility] [address] I63.9    Pacemaker Z95.0    Hyperlipidemia E78.5    Tobacco abuse Z72.0    Hypertension I10    VHD (valvular heart disease) I38    PAF (paroxysmal atrial fibrillation) ([facility]) I48.0    Cardiac pacemaker in situ Z95.0    Unspecified atrial fibrillation (HCC) I48.91    Long term current use of anticoagulant therapy Z79.01    Statin intolerance Z78.9    Chronic atrial fibrillation I48.20       Past Medical History:        Diagnosis Date    Atrial fibrillation (HCC)     **Coumadin Clinic follows PT/INRs,along w/prescribing Coumadin. **    H/O 24 hour EKG monitoring 08/08/2008    pt in atrial fib with varying ventricular rate    H/O cardiovascular stress test 07/24/2014    EF 64%,no significant change over previous study, no ischemia, no wall motion abnormality seen, normal perfusion pattern    H/O Doppler ultrasound 06/24/2012-carotidarterial duplex    emily. ICA stenosis of less than 50%, antegrade flow noted in the vertebral arteries bilaterally    H/O echocardiogram 10/25/2011    EF >55%, mild mitral and tricuspid regurg, normal LV systolic function    H/O echocardiogram 08/09/2016    LIMITED EF-55%    H/O transesophageal echocardiography (VEENA) for monitoring 06/25/2012    no evidence of atrial septal defect, patent foramen ovale or no thrombus, continue medical therapy    Hx of echocardiogram 7/14/2014    moderate mitral and tricuspid regurg, right ventricular systolic pressure elevated at 30-40mmhg, mild to moderate aortic regurg    Hx of echocardiogram 04/05/2017    EF50-55%,moderate aortic regurg,mild to moderate pulmonary HTN, moderate tricusid regurg    Hyperlipidemia     Tobacco abuse        Past Surgical History:        Procedure Laterality Date    APPENDECTOMY      BLADDER SURGERY      cyst removed    LUNG SURGERY  1994    collasped lung    PACEMAKER INSERTION  10/08/2008    medtronic    TUBAL LIGATION         Social History:    Social History     Tobacco Use    Smoking status: Current Every Day Smoker     Packs/day: 0.50     Years: 55.00     Pack years: 27.50     Types: Cigarettes    Smokeless tobacco: Never Used    Tobacco comment: 5 or 6 cigarettes a day    Substance Use Topics    Alcohol use:  No Ready to quit: Not Answered  Counseling given: Not Answered  Comment: 5 or 6 cigarettes a day       Vital Signs (Current): There were no vitals filed for this visit. BP Readings from Last 3 Encounters:   10/29/19 130/86   04/16/19 128/80   10/12/18 138/82       NPO Status:                                                                                 BMI:   Wt Readings from Last 3 Encounters:   10/29/19 111 lb 9.6 oz (50.6 kg)   04/16/19 106 lb 3.2 oz (48.2 kg)   10/12/18 108 lb 9.6 oz (49.3 kg)     There is no height or weight on file to calculate BMI. CBC  Lab Results   Component Value Date/Time    WBC 6.8 01/30/2020 08:50 AM    HGB 15.9 01/30/2020 08:50 AM    HCT 49.5 (H) 01/30/2020 08:50 AM     01/30/2020 08:50 AM     RENAL  Lab Results   Component Value Date/Time     01/30/2020 08:50 AM    K 4.3 01/30/2020 08:50 AM     01/30/2020 08:50 AM    CO2 28 01/30/2020 08:50 AM    BUN 15 01/30/2020 08:50 AM    CREATININE 0.9 01/30/2020 08:50 AM    GLUCOSE 106 (H) 01/30/2020 08:50 AM     COAGS  Lab Results   Component Value Date/Time    PROTIME 31.2 (H) 01/30/2020 08:50 AM    PROTIME 46.8 (H) 01/23/2020 11:17 AM    INR 2.55 01/30/2020 08:50 AM    APTT 34.3 (H) 08/22/2016 11:13 AM     Anesthesia Evaluation  Patient summary reviewed and Nursing notes reviewed  Airway:         Dental:          Pulmonary:   (+) pneumonia (1994):  current smoker                          ROS comment: spontaneous pneumo, 1994     Smoker, hx 1ppd x 66 years. Currently . 5ppd day  Counseled on smoking cessation.     PAT Airway Exam:  Head/Neck movement: full  Thyromental Distance: >3 FB  History of difficult intubation:  None  Mallampati Classification:  Class II  Teeth: missing upper and lower molars  Able to lie flat     LUNGS:  No increased work of breathing, good air exchange, clear to auscultation bilaterally, no crackles or wheezing Cardiovascular:    (+) hypertension (on beta blocker ):, valvular problems/murmurs (Mod AR, mild MR, mod TR):, pacemaker (Medtronic implanted 2016. Last interrogation, 2019. VVI mode, : 39.1%, VS: 60.1%, not dep. EBL: 7.5 years. ): pacemaker, dysrhythmias (PAF on dig. On coumadin, last dose 2020. INR 2.55, 2020. Multiple bruising areas on arms noted. 2/2 AC therapy): atrial fibrillation, pulmonary hypertension: mild and moderate, hyperlipidemia      ECG reviewed      Echocardiogram reviewed    Cleared by cardiology           ROS comment: Cardiac risk assessment per SHAYAN Silva-CNP. Considered a MODERATE risk candidate for any camila-operative cardiac complications.     Patients with known CAD with moderate or high risk should have surgical procedures done where they have access to invasive cardiology services if emergently needed. Echo    EF 50-55%   Left ventricle size is normal.   Mild left ventricular hypertrophy. No regional wall motion abnormalites. The left atrium is mildly dilated. Mildly enlarged right atrium size. Aortic valve is sclerotic, but not stenotic. Moderate aortic regurgitation is noted with a pressure half time of 413  msec. Sclerotic mitral valve   Mild mitral regurgitation is present. Tricuspid valve is structurally normal.   Moderate tricuspid regurgitation. Mild to moderate pulmonary hypertension. No evidence of pericardial effusion. Dilatation of the aortic root.       CARDIOVASCULAR:  Normal apical impulse, regular rate and rhythm, normal S1 and S2, no S3 or S4, and no murmur noted    CP or SOB: NO  Exercise: NO       EK2020  Atrial fibrillation with frequent ventricular-paced complexes    Left axis deviation   Anterior infarct , age undetermined (cited 2012)  ST & T wave abnormality, consider inferior ischemia        Neuro/Psych:   (+) TIA (2012),             GI/Hepatic/Renal:   (+) renal disease (bladder CA, 2012, 2013 recurrent. Followed by Dr. Park Filter):,           Endo/Other:              Pt had PAT visit. Abdominal:           Vascular:                                  Anesthesia Plan        SHAYAN Gomez - CNP Chart reviewed, pt. Interviewed and examined. Anesthesia Evaluation will follow by a certified practitioner prior to surgery.   1/28/2020

## 2020-01-29 NOTE — PROGRESS NOTES
1/29/20- Reminded of pre-testing 1/30/20 @ 0900, arrival 0830. Bring insurance card, picture ID and list of medications. Estela Hunt verbalized understanding.

## 2020-01-30 ENCOUNTER — HOSPITAL ENCOUNTER (OUTPATIENT)
Dept: PREADMISSION TESTING | Age: 80
Discharge: HOME | End: 2020-02-03
Payer: MEDICARE

## 2020-01-30 VITALS
HEART RATE: 68 BPM | HEIGHT: 65 IN | RESPIRATION RATE: 16 BRPM | SYSTOLIC BLOOD PRESSURE: 182 MMHG | WEIGHT: 108 LBS | BODY MASS INDEX: 17.99 KG/M2 | DIASTOLIC BLOOD PRESSURE: 84 MMHG | TEMPERATURE: 97.8 F

## 2020-01-30 LAB
ANION GAP SERPL CALCULATED.3IONS-SCNC: 11 MMOL/L (ref 4–16)
BUN BLDV-MCNC: 15 MG/DL (ref 6–23)
CALCIUM SERPL-MCNC: 9.4 MG/DL (ref 8.3–10.6)
CHLORIDE BLD-SCNC: 102 MMOL/L (ref 99–110)
CO2: 28 MMOL/L (ref 21–32)
CREAT SERPL-MCNC: 0.9 MG/DL (ref 0.6–1.1)
EKG DIAGNOSIS: NORMAL
EKG Q-T INTERVAL: 388 MS
EKG QRS DURATION: 86 MS
EKG QTC CALCULATION (BAZETT): 424 MS
EKG R AXIS: -51 DEGREES
EKG T AXIS: -65 DEGREES
EKG VENTRICULAR RATE: 72 BPM
GFR AFRICAN AMERICAN: >60 ML/MIN/1.73M2
GFR NON-AFRICAN AMERICAN: >60 ML/MIN/1.73M2
GLUCOSE BLD-MCNC: 106 MG/DL (ref 70–99)
HCT VFR BLD CALC: 49.5 % (ref 37–47)
HEMOGLOBIN: 15.9 GM/DL (ref 12.5–16)
INR BLD: 2.55 INDEX
MCH RBC QN AUTO: 30.5 PG (ref 27–31)
MCHC RBC AUTO-ENTMCNC: 32.1 % (ref 32–36)
MCV RBC AUTO: 95 FL (ref 78–100)
PDW BLD-RTO: 14.5 % (ref 11.7–14.9)
PLATELET # BLD: 204 K/CU MM (ref 140–440)
PMV BLD AUTO: 9.6 FL (ref 7.5–11.1)
POTASSIUM SERPL-SCNC: 4.3 MMOL/L (ref 3.5–5.1)
PROTHROMBIN TIME: 31.2 SECONDS (ref 11.7–14.5)
RBC # BLD: 5.21 M/CU MM (ref 4.2–5.4)
SODIUM BLD-SCNC: 141 MMOL/L (ref 135–145)
WBC # BLD: 6.8 K/CU MM (ref 4–10.5)

## 2020-01-30 PROCEDURE — 85610 PROTHROMBIN TIME: CPT

## 2020-01-30 PROCEDURE — 93010 ELECTROCARDIOGRAM REPORT: CPT | Performed by: INTERNAL MEDICINE

## 2020-01-30 PROCEDURE — 93005 ELECTROCARDIOGRAM TRACING: CPT | Performed by: NURSE PRACTITIONER

## 2020-01-30 PROCEDURE — 80048 BASIC METABOLIC PNL TOTAL CA: CPT

## 2020-01-30 PROCEDURE — 36415 COLL VENOUS BLD VENIPUNCTURE: CPT

## 2020-01-30 PROCEDURE — 85027 COMPLETE CBC AUTOMATED: CPT

## 2020-01-30 RX ORDER — CIPROFLOXACIN 2 MG/ML
400 INJECTION, SOLUTION INTRAVENOUS ONCE
Status: CANCELLED | OUTPATIENT
Start: 2020-02-06

## 2020-01-30 ASSESSMENT — LIFESTYLE VARIABLES: SMOKING_STATUS: 1

## 2020-02-06 ENCOUNTER — TELEPHONE (OUTPATIENT)
Dept: PHARMACY | Age: 80
End: 2020-02-06

## 2020-02-06 NOTE — TELEPHONE ENCOUNTER
Scheduled for 2/27. Procedure was changed to 2/20 due to UTI. Patient now on Cefuroxime, which has little effect on INR. Patient had already held 2 doses of warfarin before procedure was changed from 2/6 to 2/20.

## 2020-02-20 ENCOUNTER — HOSPITAL ENCOUNTER (OUTPATIENT)
Age: 80
Setting detail: OUTPATIENT SURGERY
Discharge: HOME | End: 2020-02-20
Attending: UROLOGY | Admitting: UROLOGY
Payer: MEDICARE

## 2020-02-20 ENCOUNTER — ANESTHESIA (OUTPATIENT)
Dept: OPERATING ROOM | Age: 80
End: 2020-02-20
Payer: MEDICARE

## 2020-02-20 VITALS
SYSTOLIC BLOOD PRESSURE: 155 MMHG | OXYGEN SATURATION: 97 % | DIASTOLIC BLOOD PRESSURE: 82 MMHG | RESPIRATION RATE: 16 BRPM | TEMPERATURE: 97 F | WEIGHT: 108 LBS | BODY MASS INDEX: 17.99 KG/M2 | HEIGHT: 65 IN | HEART RATE: 98 BPM

## 2020-02-20 VITALS
OXYGEN SATURATION: 99 % | RESPIRATION RATE: 79 BRPM | DIASTOLIC BLOOD PRESSURE: 59 MMHG | SYSTOLIC BLOOD PRESSURE: 113 MMHG

## 2020-02-20 PROBLEM — C67.9 BLADDER CANCER (HCC): Status: ACTIVE | Noted: 2020-02-20

## 2020-02-20 LAB
INR BLD: 1.05 INDEX
PROTHROMBIN TIME: 12.7 SECONDS (ref 11.7–14.5)

## 2020-02-20 PROCEDURE — 3600000003 HC SURGERY LEVEL 3 BASE: Performed by: UROLOGY

## 2020-02-20 PROCEDURE — 88342 IMHCHEM/IMCYTCHM 1ST ANTB: CPT

## 2020-02-20 PROCEDURE — 6360000002 HC RX W HCPCS: Performed by: ANESTHESIOLOGY

## 2020-02-20 PROCEDURE — 7100000001 HC PACU RECOVERY - ADDTL 15 MIN: Performed by: UROLOGY

## 2020-02-20 PROCEDURE — 88360 TUMOR IMMUNOHISTOCHEM/MANUAL: CPT | Performed by: NURSE PRACTITIONER

## 2020-02-20 PROCEDURE — 7100000010 HC PHASE II RECOVERY - FIRST 15 MIN: Performed by: UROLOGY

## 2020-02-20 PROCEDURE — 88305 TISSUE EXAM BY PATHOLOGIST: CPT

## 2020-02-20 PROCEDURE — 85610 PROTHROMBIN TIME: CPT

## 2020-02-20 PROCEDURE — 3600000013 HC SURGERY LEVEL 3 ADDTL 15MIN: Performed by: UROLOGY

## 2020-02-20 PROCEDURE — 2580000003 HC RX 258: Performed by: ANESTHESIOLOGY

## 2020-02-20 PROCEDURE — 6360000002 HC RX W HCPCS: Performed by: NURSE ANESTHETIST, CERTIFIED REGISTERED

## 2020-02-20 PROCEDURE — 2709999900 HC NON-CHARGEABLE SUPPLY: Performed by: UROLOGY

## 2020-02-20 PROCEDURE — 3700000001 HC ADD 15 MINUTES (ANESTHESIA): Performed by: UROLOGY

## 2020-02-20 PROCEDURE — 6360000002 HC RX W HCPCS: Performed by: UROLOGY

## 2020-02-20 PROCEDURE — 7100000000 HC PACU RECOVERY - FIRST 15 MIN: Performed by: UROLOGY

## 2020-02-20 PROCEDURE — 3700000000 HC ANESTHESIA ATTENDED CARE: Performed by: UROLOGY

## 2020-02-20 RX ORDER — CIPROFLOXACIN 2 MG/ML
400 INJECTION, SOLUTION INTRAVENOUS ONCE
Status: COMPLETED | OUTPATIENT
Start: 2020-02-20 | End: 2020-02-20

## 2020-02-20 RX ORDER — CIPROFLOXACIN 2 MG/ML
400 INJECTION, SOLUTION INTRAVENOUS ONCE
Status: DISCONTINUED | OUTPATIENT
Start: 2020-02-20 | End: 2020-02-20 | Stop reason: SDUPTHER

## 2020-02-20 RX ORDER — FENTANYL CITRATE 50 UG/ML
50 INJECTION, SOLUTION INTRAMUSCULAR; INTRAVENOUS EVERY 5 MIN PRN
Status: DISCONTINUED | OUTPATIENT
Start: 2020-02-20 | End: 2020-02-20 | Stop reason: HOSPADM

## 2020-02-20 RX ORDER — PROMETHAZINE HYDROCHLORIDE 25 MG/ML
6.25 INJECTION, SOLUTION INTRAMUSCULAR; INTRAVENOUS
Status: DISCONTINUED | OUTPATIENT
Start: 2020-02-20 | End: 2020-02-20 | Stop reason: HOSPADM

## 2020-02-20 RX ORDER — LABETALOL 20 MG/4 ML (5 MG/ML) INTRAVENOUS SYRINGE
5 EVERY 10 MIN PRN
Status: DISCONTINUED | OUTPATIENT
Start: 2020-02-20 | End: 2020-02-20 | Stop reason: HOSPADM

## 2020-02-20 RX ORDER — HYDROMORPHONE HCL 110MG/55ML
0.25 PATIENT CONTROLLED ANALGESIA SYRINGE INTRAVENOUS EVERY 5 MIN PRN
Status: DISCONTINUED | OUTPATIENT
Start: 2020-02-20 | End: 2020-02-20 | Stop reason: HOSPADM

## 2020-02-20 RX ORDER — HYDROMORPHONE HCL 110MG/55ML
0.5 PATIENT CONTROLLED ANALGESIA SYRINGE INTRAVENOUS EVERY 5 MIN PRN
Status: DISCONTINUED | OUTPATIENT
Start: 2020-02-20 | End: 2020-02-20 | Stop reason: HOSPADM

## 2020-02-20 RX ORDER — SODIUM CHLORIDE, SODIUM LACTATE, POTASSIUM CHLORIDE, CALCIUM CHLORIDE 600; 310; 30; 20 MG/100ML; MG/100ML; MG/100ML; MG/100ML
INJECTION, SOLUTION INTRAVENOUS ONCE
Status: COMPLETED | OUTPATIENT
Start: 2020-02-20 | End: 2020-02-20

## 2020-02-20 RX ORDER — PROPOFOL 10 MG/ML
INJECTION, EMULSION INTRAVENOUS PRN
Status: DISCONTINUED | OUTPATIENT
Start: 2020-02-20 | End: 2020-02-20 | Stop reason: SDUPTHER

## 2020-02-20 RX ORDER — FENTANYL CITRATE 50 UG/ML
25 INJECTION, SOLUTION INTRAMUSCULAR; INTRAVENOUS EVERY 5 MIN PRN
Status: DISCONTINUED | OUTPATIENT
Start: 2020-02-20 | End: 2020-02-20 | Stop reason: HOSPADM

## 2020-02-20 RX ORDER — LIDOCAINE HYDROCHLORIDE 20 MG/ML
INJECTION, SOLUTION INTRAVENOUS PRN
Status: DISCONTINUED | OUTPATIENT
Start: 2020-02-20 | End: 2020-02-20 | Stop reason: SDUPTHER

## 2020-02-20 RX ORDER — HYDRALAZINE HYDROCHLORIDE 20 MG/ML
5 INJECTION INTRAMUSCULAR; INTRAVENOUS EVERY 10 MIN PRN
Status: DISCONTINUED | OUTPATIENT
Start: 2020-02-20 | End: 2020-02-20 | Stop reason: HOSPADM

## 2020-02-20 RX ORDER — ONDANSETRON 2 MG/ML
4 INJECTION INTRAMUSCULAR; INTRAVENOUS
Status: DISCONTINUED | OUTPATIENT
Start: 2020-02-20 | End: 2020-02-20 | Stop reason: HOSPADM

## 2020-02-20 RX ORDER — ONDANSETRON 2 MG/ML
INJECTION INTRAMUSCULAR; INTRAVENOUS PRN
Status: DISCONTINUED | OUTPATIENT
Start: 2020-02-20 | End: 2020-02-20 | Stop reason: SDUPTHER

## 2020-02-20 RX ORDER — DEXAMETHASONE SODIUM PHOSPHATE 4 MG/ML
INJECTION, SOLUTION INTRA-ARTICULAR; INTRALESIONAL; INTRAMUSCULAR; INTRAVENOUS; SOFT TISSUE PRN
Status: DISCONTINUED | OUTPATIENT
Start: 2020-02-20 | End: 2020-02-20 | Stop reason: SDUPTHER

## 2020-02-20 RX ORDER — FENTANYL CITRATE 50 UG/ML
INJECTION, SOLUTION INTRAMUSCULAR; INTRAVENOUS PRN
Status: DISCONTINUED | OUTPATIENT
Start: 2020-02-20 | End: 2020-02-20 | Stop reason: SDUPTHER

## 2020-02-20 RX ADMIN — CIPROFLOXACIN 400 MG: 2 INJECTION, SOLUTION INTRAVENOUS at 14:44

## 2020-02-20 RX ADMIN — HYDRALAZINE HYDROCHLORIDE 5 MG: 20 INJECTION INTRAMUSCULAR; INTRAVENOUS at 16:02

## 2020-02-20 RX ADMIN — PROPOFOL 120 MG: 10 INJECTION, EMULSION INTRAVENOUS at 14:40

## 2020-02-20 RX ADMIN — DEXAMETHASONE SODIUM PHOSPHATE 8 MG: 4 INJECTION, SOLUTION INTRAMUSCULAR; INTRAVENOUS at 14:46

## 2020-02-20 RX ADMIN — FENTANYL CITRATE 50 MCG: 50 INJECTION INTRAMUSCULAR; INTRAVENOUS at 14:57

## 2020-02-20 RX ADMIN — ONDANSETRON 4 MG: 2 INJECTION INTRAMUSCULAR; INTRAVENOUS at 14:59

## 2020-02-20 RX ADMIN — LIDOCAINE HYDROCHLORIDE 100 MG: 20 INJECTION, SOLUTION INTRAVENOUS at 14:40

## 2020-02-20 RX ADMIN — FENTANYL CITRATE 25 MCG: 50 INJECTION INTRAMUSCULAR; INTRAVENOUS at 14:51

## 2020-02-20 RX ADMIN — SODIUM CHLORIDE, POTASSIUM CHLORIDE, SODIUM LACTATE AND CALCIUM CHLORIDE: 600; 310; 30; 20 INJECTION, SOLUTION INTRAVENOUS at 14:35

## 2020-02-20 RX ADMIN — HYDRALAZINE HYDROCHLORIDE 5 MG: 20 INJECTION INTRAMUSCULAR; INTRAVENOUS at 16:17

## 2020-02-20 RX ADMIN — HYDRALAZINE HYDROCHLORIDE 5 MG: 20 INJECTION INTRAMUSCULAR; INTRAVENOUS at 15:41

## 2020-02-20 RX ADMIN — FENTANYL CITRATE 25 MCG: 50 INJECTION INTRAMUSCULAR; INTRAVENOUS at 14:59

## 2020-02-20 ASSESSMENT — PULMONARY FUNCTION TESTS
PIF_VALUE: 12
PIF_VALUE: 8
PIF_VALUE: 10
PIF_VALUE: 10
PIF_VALUE: 1
PIF_VALUE: 10
PIF_VALUE: 3
PIF_VALUE: 10
PIF_VALUE: 11
PIF_VALUE: 12
PIF_VALUE: 6
PIF_VALUE: 5
PIF_VALUE: 7
PIF_VALUE: 12
PIF_VALUE: 7
PIF_VALUE: 10
PIF_VALUE: 8
PIF_VALUE: 1
PIF_VALUE: 0
PIF_VALUE: 21
PIF_VALUE: 1
PIF_VALUE: 1
PIF_VALUE: 0
PIF_VALUE: 10
PIF_VALUE: 8
PIF_VALUE: 6
PIF_VALUE: 0
PIF_VALUE: 12
PIF_VALUE: 10
PIF_VALUE: 0
PIF_VALUE: 10
PIF_VALUE: 10

## 2020-02-20 ASSESSMENT — PAIN - FUNCTIONAL ASSESSMENT: PAIN_FUNCTIONAL_ASSESSMENT: 0-10

## 2020-02-20 NOTE — BRIEF OP NOTE
Brief Postoperative Note    Patient: Larissa Fierro    Date: 2020  : 1940     DOA: 2020   MRN: [id number]    [facility] [id number]  ROOM#: OR/NONE     Pre-operative Diagnosis: bladder cancer    Post-operative Diagnosis: Same    Procedure: cystoscopy, bladder biopsy, fulguration    Anesthesia: General    Surgeons/Assistants: Concepción Howard    Estimated Blood Loss: less than 50 cc    Complications: None    Specimens: Was Obtained: bladder biopsy x 4    Findings: erythema at dome    Plan: follow up is set for 2020 at [facility]    Dictation # [id number]    Otoniel Celestin MD     Electronically signed at 2020

## 2020-02-20 NOTE — ANESTHESIA PRE PROCEDURE
67.00     Pack years: 33.50     Types: Cigarettes     Start date: 5    Smokeless tobacco: Never Used    Tobacco comment: 5 or 6 cigarettes a day ( per pt 2/20/2020   Substance Use Topics    Alcohol use: No                                Ready to quit: Not Answered  Counseling given: Not Answered  Comment: 5 or 6 cigarettes a day ( per pt 2/20/2020      Vital Signs (Current):   Vitals:    02/18/20 1118 02/20/20 1317   BP:  (!) 173/81   Pulse:  74   Resp:  16   Temp:  37.2 °C (98.9 °F)   TempSrc:  Temporal   SpO2:  96%   Weight: 108 lb (49 kg) 108 lb (49 kg)   Height: 5' 4.5\" (1.638 m) 5' 4.5\" (1.638 m)                                              BP Readings from Last 3 Encounters:   02/20/20 (!) 173/81   01/30/20 (!) 182/84   10/29/19 130/86       NPO Status: Time of last liquid consumption: 0830                        Time of last solid consumption: 2030                        Date of last liquid consumption: 02/20/20                        Date of last solid food consumption: 02/19/20    BMI:   Wt Readings from Last 3 Encounters:   02/20/20 108 lb (49 kg)   01/30/20 108 lb (49 kg)   10/29/19 111 lb 9.6 oz (50.6 kg)     Body mass index is 18.25 kg/m². CBC:   Lab Results   Component Value Date    WBC 6.8 01/30/2020    RBC 5.21 01/30/2020    HGB 15.9 01/30/2020    HCT 49.5 01/30/2020    MCV 95.0 01/30/2020    RDW 14.5 01/30/2020     01/30/2020       CMP:   Lab Results   Component Value Date     01/30/2020    K 4.3 01/30/2020     01/30/2020    CO2 28 01/30/2020    BUN 15 01/30/2020    CREATININE 0.9 01/30/2020    GFRAA >60 01/30/2020    LABGLOM >60 01/30/2020    GLUCOSE 106 01/30/2020    PROT 6.3 10/05/2017    PROT 6.1 06/24/2012    CALCIUM 9.4 01/30/2020    BILITOT 2.0 10/05/2017    ALKPHOS 75 10/05/2017    AST 18 10/05/2017    ALT 11 10/05/2017       POC Tests: No results for input(s): POCGLU, POCNA, POCK, POCCL, POCBUN, POCHEMO, POCHCT in the last 72 hours.     Coags:   Lab Results Component Value Date    PROTIME 12.7 02/20/2020    PROTIME 46.8 01/23/2020    INR 1.05 02/20/2020    APTT 34.3 08/22/2016       HCG (If Applicable): No results found for: PREGTESTUR, PREGSERUM, HCG, HCGQUANT     ABGs: No results found for: PHART, PO2ART, IAM0MLV, TIK4UPN, BEART, O0CKMBVG     Type & Screen (If Applicable):  No results found for: [facility]    Anesthesia Evaluation    Airway: Mallampati: II        Dental: normal exam         Pulmonary:normal exam                               Cardiovascular:                      Neuro/Psych:               GI/Hepatic/Renal:             Endo/Other:                     Abdominal:           Vascular:                                        Anesthesia Plan      general     ASA 3       Induction: intravenous.           Plan discussed with surgical team.                  SHAYAN Meng - PIERCE   2/20/2020

## 2020-02-20 NOTE — PROGRESS NOTES
Patient returned from PACU   A+Ox4, VSS,  Assessment completed as per Doc Flow, patient has no c/o pain. Beverage offered. Call light in reach and bed in low position Will call to waiting room for visitor/family.  Patient up to restroom, voided a large amount of clear yellow urine

## 2020-02-20 NOTE — PROGRESS NOTES
1513 - transferred from OR on cart; monitor applied and alarms on; oral airway in place; bedside handoff provided by Myrna Castro and Mery Lees  1086 - patient awake, oral airway removed; nasal cannula applied at 2 lpm  1541 - medicated with 5 mg hydralazine IVP for consecutive SBP >160  1602 - medicated with 5 mg hydralazine IVP for continued hypertension  1617 - medicated with 5 mg hydralazine IVP for continued hypertension  1630 - , phase one care completed  1645 - transferred to [facility] on cart  1650 - bedside handoff given to Lora PRESLEY and Terrence Wick RN

## 2020-02-27 ENCOUNTER — ANTI-COAG VISIT (OUTPATIENT)
Dept: PHARMACY | Age: 80
End: 2020-02-27
Payer: MEDICARE

## 2020-02-27 LAB
INTERNATIONAL NORMALIZATION RATIO, POC: 2
POC INR: 2 INDEX
POC INR: ABNORMAL INDEX
PROTHROMBIN TIME, POC: 23.8 SECONDS (ref 10–14.3)

## 2020-02-27 PROCEDURE — 99211 OFF/OP EST MAY X REQ PHY/QHP: CPT

## 2020-02-27 PROCEDURE — 85610 PROTHROMBIN TIME: CPT

## 2020-02-27 PROCEDURE — 36416 COLLJ CAPILLARY BLOOD SPEC: CPT

## 2020-03-16 RX ORDER — DIGOXIN 125 MCG
TABLET ORAL
Qty: 90 TABLET | Refills: 2 | Status: SHIPPED | OUTPATIENT
Start: 2020-03-16 | End: 2020-05-05 | Stop reason: SDUPTHER

## 2020-03-16 NOTE — TELEPHONE ENCOUNTER
Next ov 5/2020 90 days patient stated she thinks   Pharmacy may have called needs a refill for Digoxen

## 2020-03-19 ENCOUNTER — TELEPHONE (OUTPATIENT)
Dept: PHARMACY | Age: 80
End: 2020-03-19

## 2020-04-20 ENCOUNTER — TELEPHONE (OUTPATIENT)
Dept: PHARMACY | Age: 80
End: 2020-04-20

## 2020-04-20 NOTE — TELEPHONE ENCOUNTER
Tracking Only    PHSO: No  Total # of Interventions Recommended: 0    - Maintenance Safety Lab Monitoring #: 0  Total Interventions Accepted: 0  Time Spent (min): 10    Vince WrightD

## 2020-05-05 ENCOUNTER — OFFICE VISIT (OUTPATIENT)
Dept: CARDIOLOGY CLINIC | Age: 80
End: 2020-05-05
Payer: MEDICARE

## 2020-05-05 VITALS
SYSTOLIC BLOOD PRESSURE: 128 MMHG | DIASTOLIC BLOOD PRESSURE: 88 MMHG | HEIGHT: 65 IN | HEART RATE: 65 BPM | BODY MASS INDEX: 17.99 KG/M2 | WEIGHT: 108 LBS

## 2020-05-05 PROCEDURE — 4004F PT TOBACCO SCREEN RCVD TLK: CPT | Performed by: INTERNAL MEDICINE

## 2020-05-05 PROCEDURE — 1123F ACP DISCUSS/DSCN MKR DOCD: CPT | Performed by: INTERNAL MEDICINE

## 2020-05-05 PROCEDURE — 4040F PNEUMOC VAC/ADMIN/RCVD: CPT | Performed by: INTERNAL MEDICINE

## 2020-05-05 PROCEDURE — G8419 CALC BMI OUT NRM PARAM NOF/U: HCPCS | Performed by: INTERNAL MEDICINE

## 2020-05-05 PROCEDURE — G8428 CUR MEDS NOT DOCUMENT: HCPCS | Performed by: INTERNAL MEDICINE

## 2020-05-05 PROCEDURE — G8400 PT W/DXA NO RESULTS DOC: HCPCS | Performed by: INTERNAL MEDICINE

## 2020-05-05 PROCEDURE — 1090F PRES/ABSN URINE INCON ASSESS: CPT | Performed by: INTERNAL MEDICINE

## 2020-05-05 PROCEDURE — 99214 OFFICE O/P EST MOD 30 MIN: CPT | Performed by: INTERNAL MEDICINE

## 2020-05-05 RX ORDER — DIGOXIN 125 MCG
125 TABLET ORAL DAILY
Qty: 90 TABLET | Refills: 2 | Status: SHIPPED | OUTPATIENT
Start: 2020-05-05 | End: 2021-04-30

## 2020-05-05 NOTE — PROGRESS NOTES
[id number] Score for Atrial Fibrillation Stroke Risk   Risk   Factors  Component Value   C CHF No 0   H HTN Yes 1   A2 Age >= 76 Yes,  (75 y.o.) 2   D DM No 0   S2 Prior Stroke/TIA Yes 2   V Vascular Disease No 1    A Age 74-69 No,  (75 y.o.) 0   Sc Sex female 1    [id number]  Score  7   Score last updated 5/5/20 2:30 PM EDT    Click here for a link to the UpToDate guideline \"Atrial Fibrillation: Anticoagulation therapy to prevent embolization    Disclaimer: Risk Score calculation is dependent on accuracy of patient problem list and past encounter diagnosis.
hematuria  · Musculoskeletal:  None  · Integumentary: No rash or pruritis  · Neurological: No TIA or stroke symptoms  · Psychiatric: No anxiety or depression  · Endocrine: No malaise, fatigue or temperature intolerance  · Hematologic/Lymphatic: No bleeding problems, blood clots or swollen lymph nodes  · Allergic/Immunologic: No nasal congestion or hives    Objective:      Physical Exam:  /88 (Site: Right Upper Arm, Position: Sitting, Cuff Size: Medium Adult)   Pulse 65   Ht 5' 5\" (1.651 m)   Wt 108 lb (49 kg)   BMI 17.97 kg/m²   Wt Readings from Last 3 Encounters:   05/05/20 108 lb (49 kg)   02/20/20 108 lb (49 kg)   01/30/20 108 lb (49 kg)     Body mass index is 17.97 kg/m². Vitals:    05/05/20 1409   BP: 128/88   Pulse: 65        General Appearance:  No distress, conversant  Constitutional:  Well developed, Well nourished, No acute distress, Non-toxic appearance. HENT:  Normocephalic, Atraumatic, Bilateral external ears normal, Oropharynx moist, No oral exudates, Nose normal. Neck- Normal range of motion, No tenderness, Supple, No stridor,no apical-carotid delay  Eyes:  PERRL, EOMI, Conjunctiva normal, No discharge. Respiratory:  Normal breath sounds, No respiratory distress, No wheezing, No chest tenderness. ,no use of accessory muscles, NO crackles  Cardiovascular: (PMI) apex non displaced,no lifts no thrills,S1 and S2 audible, No added heart sounds, No signs of ankle edema, or volume overload, No evidence of JVD, No crackles  GI:  Bowel sounds normal, Soft, No tenderness, No masses, No gross visceromegaly   :  No costovertebral angle tenderness   Musculoskeletal:  No edema, no tenderness, no deformities.  Back- no tenderness  Integument:  Bilateral lower extremity chronic Coumadin changes  Lymphatic:  No lymphadenopathy noted   Neurologic:  Alert & oriented x 3, CN 2-12 normal, normal motor function, normal sensory function, no focal deficits noted   Psychiatric:  Speech and behavior appropriate

## 2020-05-07 ENCOUNTER — TELEPHONE (OUTPATIENT)
Dept: CARDIOLOGY CLINIC | Age: 80
End: 2020-05-07

## 2020-05-07 NOTE — TELEPHONE ENCOUNTER
Called patient to schedule echo. Patient states Dr Jackeline Torres stated she can wait until after pending bladder surgery. Patient wants to wait to schedule echo until after bladder procedure. Will defer out a month and follow up then. Patient agreed to that plan.

## 2020-05-08 RX ORDER — METOPROLOL TARTRATE 50 MG/1
50 TABLET, FILM COATED ORAL 2 TIMES DAILY
Qty: 180 TABLET | Refills: 3 | Status: SHIPPED | OUTPATIENT
Start: 2020-05-08 | End: 2021-05-03 | Stop reason: SDUPTHER

## 2020-05-08 NOTE — TELEPHONE ENCOUNTER
90 Days with refills this was to have been sent last week   When she was seen she will be out Saturday

## 2020-05-21 ENCOUNTER — TELEPHONE (OUTPATIENT)
Dept: PHARMACY | Age: 80
End: 2020-05-21

## 2020-05-22 ENCOUNTER — HOSPITAL ENCOUNTER (OUTPATIENT)
Age: 80
Discharge: HOME | End: 2020-05-22
Payer: MEDICARE

## 2020-05-22 ENCOUNTER — ANTI-COAG VISIT (OUTPATIENT)
Dept: PHARMACY | Age: 80
End: 2020-05-22

## 2020-05-22 LAB
INR BLD: 1.61 INDEX
PROTHROMBIN TIME: 19.6 SECONDS (ref 11.7–14.5)
REASON FOR REJECTION: NORMAL
REJECTED TEST: NORMAL

## 2020-05-22 PROCEDURE — 99212 OFFICE O/P EST SF 10 MIN: CPT

## 2020-05-22 PROCEDURE — 36415 COLL VENOUS BLD VENIPUNCTURE: CPT

## 2020-05-22 PROCEDURE — 85610 PROTHROMBIN TIME: CPT

## 2020-05-22 PROCEDURE — 80061 LIPID PANEL: CPT

## 2020-05-27 ENCOUNTER — HOSPITAL ENCOUNTER (OUTPATIENT)
Age: 80
Setting detail: SPECIMEN
Discharge: HOME | End: 2020-05-27
Payer: MEDICARE

## 2020-05-27 LAB
ALBUMIN SERPL-MCNC: 4.5 GM/DL (ref 3.4–5)
ALP BLD-CCNC: 85 IU/L (ref 40–129)
ALT SERPL-CCNC: 10 U/L (ref 10–40)
ANION GAP SERPL CALCULATED.3IONS-SCNC: 12 MMOL/L (ref 4–16)
AST SERPL-CCNC: 16 IU/L (ref 15–37)
BILIRUB SERPL-MCNC: 1.4 MG/DL (ref 0–1)
BUN BLDV-MCNC: 15 MG/DL (ref 6–23)
CALCIUM SERPL-MCNC: 8.9 MG/DL (ref 8.3–10.6)
CHLORIDE BLD-SCNC: 100 MMOL/L (ref 99–110)
CHOLESTEROL: 199 MG/DL
CO2: 29 MMOL/L (ref 21–32)
CREAT SERPL-MCNC: 0.9 MG/DL (ref 0.6–1.1)
DIGOXIN LEVEL: 1.3 NG/ML (ref 0.8–2)
DOSE AMOUNT: NORMAL
DOSE TIME: NORMAL
GFR AFRICAN AMERICAN: >60 ML/MIN/1.73M2
GFR NON-AFRICAN AMERICAN: >60 ML/MIN/1.73M2
GLUCOSE BLD-MCNC: 95 MG/DL (ref 70–99)
HDLC SERPL-MCNC: 52 MG/DL
LDL CHOLESTEROL DIRECT: 139 MG/DL
POTASSIUM SERPL-SCNC: 4.3 MMOL/L (ref 3.5–5.1)
SODIUM BLD-SCNC: 141 MMOL/L (ref 135–145)
TOTAL PROTEIN: 6.2 GM/DL (ref 6.4–8.2)
TRIGL SERPL-MCNC: 96 MG/DL

## 2020-05-27 PROCEDURE — 80053 COMPREHEN METABOLIC PANEL: CPT

## 2020-05-27 PROCEDURE — 36415 COLL VENOUS BLD VENIPUNCTURE: CPT

## 2020-05-27 PROCEDURE — 83721 ASSAY OF BLOOD LIPOPROTEIN: CPT

## 2020-05-27 PROCEDURE — 80061 LIPID PANEL: CPT

## 2020-05-27 PROCEDURE — 80162 ASSAY OF DIGOXIN TOTAL: CPT

## 2020-06-07 ENCOUNTER — TELEPHONE (OUTPATIENT)
Dept: CARDIOLOGY CLINIC | Age: 80
End: 2020-06-07

## 2020-06-09 RX ORDER — PRAVASTATIN SODIUM 10 MG
10 TABLET ORAL DAILY
Qty: 90 TABLET | Refills: 3 | Status: SHIPPED | OUTPATIENT
Start: 2020-06-09 | End: 2021-07-22

## 2020-06-09 NOTE — TELEPHONE ENCOUNTER
I tried to call the patient but busy tone. Per Dr. Marni Alcantara patient to be on pravastatin 10 mg for 1 week and then pravastatin 20 mg 1 week. Due to the LDL was high.  (139)

## 2020-06-10 ENCOUNTER — TELEPHONE (OUTPATIENT)
Dept: PHARMACY | Age: 80
End: 2020-06-10

## 2020-06-11 ENCOUNTER — HOSPITAL ENCOUNTER (OUTPATIENT)
Age: 80
Discharge: HOME | End: 2020-06-11
Payer: MEDICARE

## 2020-06-11 ENCOUNTER — ANTI-COAG VISIT (OUTPATIENT)
Dept: PHARMACY | Age: 80
End: 2020-06-11
Payer: MEDICARE

## 2020-06-11 LAB
INR BLD: 2.55 INDEX
PROTHROMBIN TIME: 31.2 SECONDS (ref 11.7–14.5)

## 2020-06-11 PROCEDURE — 99211 OFF/OP EST MAY X REQ PHY/QHP: CPT

## 2020-06-11 PROCEDURE — 85610 PROTHROMBIN TIME: CPT

## 2020-06-11 PROCEDURE — 36415 COLL VENOUS BLD VENIPUNCTURE: CPT

## 2020-06-11 NOTE — PROGRESS NOTES
purposes of billing, this is a virtual visit with your provider for which we will submit a claim for reimbursement with your insurance company. You may be responsible for any copays, coinsurance amounts or other amounts not covered by your insurance company.      Electronically signed by Matheus Rapp, [address] [place] [address] on 6/11/20 at 2:42 PM EDT    CLINICAL PHARMACY CONSULT: MED RECONCILIATION/REVIEW Ald[place]  22. Tracking Only    PHSO: No  Total # of Interventions Recommended: 0    - Maintenance Safety Lab Monitoring #: 1  Total Interventions Accepted: 0  Time Spent (min): Vaishali Tse, PharmD

## 2020-06-16 ENCOUNTER — PROCEDURE VISIT (OUTPATIENT)
Dept: CARDIOLOGY CLINIC | Age: 80
End: 2020-06-16
Payer: MEDICARE

## 2020-06-16 LAB
LV EF: 58 %
LVEF MODALITY: NORMAL

## 2020-06-16 PROCEDURE — 93306 TTE W/DOPPLER COMPLETE: CPT | Performed by: INTERNAL MEDICINE

## 2020-06-22 ENCOUNTER — TELEPHONE (OUTPATIENT)
Dept: CARDIOLOGY CLINIC | Age: 80
End: 2020-06-22

## 2020-06-22 NOTE — TELEPHONE ENCOUNTER
Pt wants to call to schedule her F/U due to ABN Echo. Summary   Left ventricular systolic function is normal with an ejection fraction of   55-60%. Mild bilateral atrial dilatation. Sclerotic, but non-stenotic aortic valve. Sclerotic mitral valve. Severe Aortic regurgitation   Severe Tricuspid regurgitation   mild mitral regurgitation. Right ventricular systolic pressure of 47 mmHg consistent with mild to   moderate pulmonary hypertension. No evidence of pericardial effusion.

## 2020-06-23 ENCOUNTER — TELEPHONE (OUTPATIENT)
Dept: CARDIOLOGY CLINIC | Age: 80
End: 2020-06-23

## 2020-06-23 NOTE — TELEPHONE ENCOUNTER
Patient stated that she received a call about scheduling something from Radha Dominguez. Please return her call at ph# [phone].

## 2020-06-30 ENCOUNTER — OFFICE VISIT (OUTPATIENT)
Dept: CARDIOLOGY CLINIC | Age: 80
End: 2020-06-30
Payer: MEDICARE

## 2020-06-30 ENCOUNTER — PROCEDURE VISIT (OUTPATIENT)
Dept: CARDIOLOGY CLINIC | Age: 80
End: 2020-06-30
Payer: MEDICARE

## 2020-06-30 VITALS
HEIGHT: 65 IN | BODY MASS INDEX: 17.79 KG/M2 | WEIGHT: 106.8 LBS | DIASTOLIC BLOOD PRESSURE: 80 MMHG | SYSTOLIC BLOOD PRESSURE: 122 MMHG | HEART RATE: 80 BPM

## 2020-06-30 PROBLEM — I35.1 NONRHEUMATIC AORTIC VALVE INSUFFICIENCY: Status: ACTIVE | Noted: 2020-06-30

## 2020-06-30 PROBLEM — I36.1 NONRHEUMATIC TRICUSPID VALVE REGURGITATION: Status: ACTIVE | Noted: 2020-06-30

## 2020-06-30 PROCEDURE — 99214 OFFICE O/P EST MOD 30 MIN: CPT | Performed by: INTERNAL MEDICINE

## 2020-06-30 PROCEDURE — 4004F PT TOBACCO SCREEN RCVD TLK: CPT | Performed by: INTERNAL MEDICINE

## 2020-06-30 PROCEDURE — 93294 REM INTERROG EVL PM/LDLS PM: CPT | Performed by: INTERNAL MEDICINE

## 2020-06-30 PROCEDURE — 1090F PRES/ABSN URINE INCON ASSESS: CPT | Performed by: INTERNAL MEDICINE

## 2020-06-30 PROCEDURE — G8419 CALC BMI OUT NRM PARAM NOF/U: HCPCS | Performed by: INTERNAL MEDICINE

## 2020-06-30 PROCEDURE — 4040F PNEUMOC VAC/ADMIN/RCVD: CPT | Performed by: INTERNAL MEDICINE

## 2020-06-30 PROCEDURE — G8427 DOCREV CUR MEDS BY ELIG CLIN: HCPCS | Performed by: INTERNAL MEDICINE

## 2020-06-30 PROCEDURE — 1123F ACP DISCUSS/DSCN MKR DOCD: CPT | Performed by: INTERNAL MEDICINE

## 2020-06-30 PROCEDURE — 93296 REM INTERROG EVL PM/IDS: CPT | Performed by: INTERNAL MEDICINE

## 2020-06-30 PROCEDURE — G8400 PT W/DXA NO RESULTS DOC: HCPCS | Performed by: INTERNAL MEDICINE

## 2020-06-30 NOTE — PROGRESS NOTES
VHD (valvular heart disease)    10. Nonrheumatic aortic valve insufficiency    11. Nonrheumatic tricuspid valve regurgitation         VHD (valvular heart disease)  Her echo shows significant aortic and tricuspid regurgitation I have advised her to undergo VEENA at some point she may need intervention for these valves she will decide when it is appropriate for her when she has support at home due to her  and we will plan VEENA. She does not have any signs of heart failure or volume overload  Went over the pathophysiology including signs and symptoms and eventual plan for valvular disease  He understands that she may need surgery she also understands that may be high risk situation  She will call me when she decides about getting VEENA      PAF (paroxysmal atrial fibrillation) ([address]  She has been anticoagulation with Coumadin for many years. In the past we have talked about DOACS either xarelto or eliquis and the benefits over Coumadin including but there is profile and improved  Efficacy over Coumadin. Yady Woours She wants to continue with coumadin  She can hold coumadin for urology procedures if needed      Pacemaker  Pacemaker was interrogated in June 2020 results were reviewed with patient    Cerebral infarction [facility])  History of A. fib and CVA. She's currently on Coumadin    Hypertension  Blood pressure is well-controlled     Dyslipidemia :  All available lab work was reviewed. Patient was advised to repeat lab work before next visit      Counseled extensively and medication compliance urged. We discussed that for the  prevention of ASCVD our  goal is aggressive risk modification. Patient is encouraged to exercise even a brisk walk for 30 minutes  at least 3 to 4 times a week   Various goals were discussed and questions answered. Continue current medications. Appropriate prescriptions are addressed and refills ordered. Questions answered and patient verbalizes understanding.   Call for any problems, questions, or

## 2020-06-30 NOTE — LETTER
Ryan Hopper  1940  [id number]    Have you had any Chest Pain - No      Have you had any Shortness of Breath - Yes  If Yes - When on exertion    Have you had any dizziness - No      Have you had any palpitations - No      Is the patient on any of the following medications - no  If Yes DO EKG    Do you have any edema - swelling in legs    If Yes - CHECK TO SEE IF THE EDEMA IS PITTING  How long have they been having edema - Months  If Yes - Have they worn compression stockings No    Check Venous \"LEG PROBLEM Questionnaire\"    Do you have a surgery or procedure scheduled in the near future - no    Ask patient [facility] if [facility] are not already [facility]    Check to see if we have an E-MAIL on file for the patient    Check medication list thoroughly!!!  BE SURE TO ASK PATIENT IF THEY NEED MEDICATION REFILLS

## 2020-07-09 ENCOUNTER — ANTI-COAG VISIT (OUTPATIENT)
Dept: PHARMACY | Age: 80
End: 2020-07-09
Payer: MEDICARE

## 2020-07-09 ENCOUNTER — TELEPHONE (OUTPATIENT)
Dept: PHARMACY | Age: 80
End: 2020-07-09

## 2020-07-09 LAB
INTERNATIONAL NORMALIZATION RATIO, POC: 3.3
POC INR: 3.3 INDEX
PROTHROMBIN TIME, POC: 39.9 SECONDS (ref 10–14.3)

## 2020-07-09 PROCEDURE — 36416 COLLJ CAPILLARY BLOOD SPEC: CPT

## 2020-07-09 PROCEDURE — 99212 OFFICE O/P EST SF 10 MIN: CPT

## 2020-07-09 PROCEDURE — 85610 PROTHROMBIN TIME: CPT

## 2020-07-09 NOTE — PROGRESS NOTES
Medication Management Service  [facility]  [phone]    Visit Date: 7/9/2020   Subjective:       Kamaljit Farias is a 67 y.o. female who presents to clinic today for anticoagulation monitoring and adjustment. Patient seen in clinic for warfarin management due to  Indication:   atrial fibrillation and TIA. INR goal: of 2.0-3.0. Duration of therapy: indefinite. Patient reports the following:   Adherent with regimen  Missed or extra doses:  None   Bleeding or thromboembolic side effects:  None  Significant medication changes:  Started pravastatin  Significant dietary changes: None  Significant alcohol or tobacco changes: None  Significant recent illness, disease state changes, or hospitalization:  None  Upcoming surgeries or procedures:  None  Falls: None           Assessment and Plan     PT/INR done in office per protocol. INR today is 3.3, supratherapeutic, likely due to diarrhea yesterday. Patient reports she needs 2 valves replaced and is considering whether or not to have the surgery. Patient also waiting for treatment for bladder cancer. Patient started pravastatin 10mg about 3.5 weeks ago. Some case reports states pravastatin can increase INR while majority demonstrate no effect on INR. Will monitor. Patient wished to minimize appointments to limit possible expose to COVID-19. Patient to call if any increase in bruising prior to next scheduled appointment. Plan: Take warfarin 5mg x1 then will continue current regimen of warfarin 5mg daily except 7.5mg on Tuesdays and Saturdays. Recheck INR in 5 week(s). Patient verbalized understanding of dosing directions and information discussed. Dosing schedule given to patient including phone number, appointment date, and time. Progress note sent to referring office. Patient acknowledges working in consult agreement with pharmacist as referred by his/her physician.       Electronically signed by Mark Anthony Lees [id number] on 7/9/20 at 11:36 AM EDT    CLINICAL PHARMACY CONSULT: MED RECONCILIATION/REVIEW ADDENDUM    For Pharmacy Admin Tracking Only    PHSO: No  Total # of Interventions Recommended: 1  - Decreased Dose #: 1  - Maintenance Safety Lab Monitoring #: 1  Total Interventions Accepted: 1  Time Spent (min): 30    Marquetta Harada, PharmD

## 2020-07-09 NOTE — TELEPHONE ENCOUNTER
Recent Travel Screening and Travel History documentation:     Travel Screening       Question Response     Do you have any of the following symptoms? None of these     In the last month, have you been in contact with someone who was confirmed or suspected to have Coronavirus / COVID-19? No / Unsure     Have you traveled internationally in the last month? No      Travel History   Travel since 06/09/20     No documented travel since 06/09/20         Patient called to confirm appointment time.      Emanuel Vila PharmD, [place]  7/9/2020  7:40 AM    CLINICAL PHARMACY CONSULT: MED RECONCILIATION/REVIEW ADDENDUM    For Pharmacy Admin Tracking Only    PHSO: No  Total # of Interventions Recommended: 0    Total Interventions Accepted: 0  Time Spent (min): 5    Vince DahlD

## 2020-08-11 ENCOUNTER — TELEPHONE (OUTPATIENT)
Dept: PHARMACY | Age: 80
End: 2020-08-11

## 2020-08-11 NOTE — TELEPHONE ENCOUNTER
Patient left  stating she has an appointment Thursday and is to start BCG for bladder cancer today. Patient was unsure if this would impact her INR. Per [facility] BCG does not interact with warfarin. Called patient back and notified of the above. Instructed patient to continue same dose of warfarin and follow up INR on Thursday as scheduled. Patient voiced understanding.     CLINICAL PHARMACY CONSULT: MED RECONCILIATION/REVIEW ADDENDUM    For Pharmacy Admin Tracking Only    PHSO: No  Total # of Interventions Recommended: 0  Total Interventions Accepted: 0  Time Spent (min): 5    Luluryne Pearl, PharmD

## 2020-08-13 ENCOUNTER — ANTI-COAG VISIT (OUTPATIENT)
Dept: PHARMACY | Age: 80
End: 2020-08-13
Payer: MEDICARE

## 2020-08-13 VITALS — TEMPERATURE: 97.8 F

## 2020-08-13 LAB
INTERNATIONAL NORMALIZATION RATIO, POC: 3.8
POC INR: 3.8 INDEX
PROTHROMBIN TIME, POC: 45.9 SECONDS (ref 10–14.3)

## 2020-08-13 PROCEDURE — 85610 PROTHROMBIN TIME: CPT

## 2020-08-13 PROCEDURE — 99212 OFFICE O/P EST SF 10 MIN: CPT

## 2020-08-13 PROCEDURE — 36416 COLLJ CAPILLARY BLOOD SPEC: CPT

## 2020-08-13 RX ORDER — WARFARIN SODIUM 5 MG/1
5 TABLET ORAL DAILY
COMMUNITY
End: 2020-10-19

## 2020-08-13 NOTE — PROGRESS NOTES
Medication Management Service  [facility]  [phone]    Visit Date: 8/13/2020   Subjective:       Renan Disla is a 67 y.o. female who presents to clinic today for anticoagulation monitoring an adjustment. Patient seen in clinic for warfarin management due to  Indication:   atrial fibrillation and TIA. INR goal: of 2.0-3.0. Duration of therapy: indefinite. Patient reports the following:   Adherent with regimen  Missed or extra doses:  None   Bleeding or thromboembolic side effects:  None  Significant medication changes:  Bladder injection  Significant dietary changes: None  Significant alcohol or tobacco changes: None  Significant recent illness, disease state changes, or hospitalization:  None  Upcoming surgeries or procedures:  None  Falls: None           Assessment and Plan     PT/INR done in office per protocol. INR today is 3.8, supratherapeutic. Bladder treatment does not interact with warfarin. Plan:  Take warfarin 2.5mg x1 then decrease weekly dose by ~6% to warfarin 5mg daily except 7.5mg on Tuesdays  Recheck INR in 2 week(s). Patient verbalized understanding of dosing directions and information discussed. Dosing schedule given to patient including phone number, appointment date, and time. Progress note sent to referring office. Patient acknowledges working in consult agreement with pharmacist as referred by his/her physician.       Electronically signed by Alejandro Lewis, [address] on 8/13/20 at 10:58 AM EDT    CLINICAL PHARMACY CONSULT: MED RECONCILIATION/REVIEW Alda  22. Tracking Only    PHSO: No  Total # of Interventions Recommended: 1  - Decreased Dose #: 1  - Maintenance Safety Lab Monitoring #: 1  Total Interventions Accepted: 1  Time Spent (min): Leann Fong, [place]

## 2020-08-27 ENCOUNTER — ANTI-COAG VISIT (OUTPATIENT)
Dept: PHARMACY | Age: 80
End: 2020-08-27
Payer: MEDICARE

## 2020-08-27 VITALS — TEMPERATURE: 98.8 F

## 2020-08-27 LAB
INTERNATIONAL NORMALIZATION RATIO, POC: 2.8
POC INR: 2.8 INDEX
PROTHROMBIN TIME, POC: 33.8 SECONDS (ref 10–14.3)

## 2020-08-27 PROCEDURE — 99211 OFF/OP EST MAY X REQ PHY/QHP: CPT

## 2020-08-27 PROCEDURE — 36416 COLLJ CAPILLARY BLOOD SPEC: CPT

## 2020-08-27 PROCEDURE — 85610 PROTHROMBIN TIME: CPT

## 2020-08-27 NOTE — PROGRESS NOTES
Medication Management Service  [facility]  [phone]    Visit Date: 8/27/2020   Subjective:       Higinio Stout is a 67 y.o. female who presents to clinic today for anticoagulation monitoring and adjustment. Patient seen in clinic for warfarin management due to  Indication:   atrial fibrillation. INR goal: of 2.0-3.0. Duration of therapy: indefinite. Patient reports the following:   Adherent with regimen  Missed or extra doses:  None   Bleeding or thromboembolic side effects:  None  Significant medication changes:  None  Significant dietary changes: None  Significant alcohol or tobacco changes: None  Significant recent illness, disease state changes, or hospitalization:  None  Upcoming surgeries or procedures:  None  Falls: None           Assessment and Plan     PT/INR done in office per protocol. INR today is 2.8, therapeutic. Plan: Will continue current regimen of warfarin 5mg daily except 7.5mg on Tuesdays. Recheck INR in 4 week(s). Patient verbalized understanding of dosing directions and information discussed. Dosing schedule given to patient including phone number, appointment date, and time. Progress note sent to referring office. Patient acknowledges working in consult agreement with pharmacist as referred by his/her physician.       Electronically signed by Sylvie Castro, [address] [place] [address] on 8/27/20 at 1:32 PM EDT    CLINICAL PHARMACY CONSULT: MED RECONCILIATION/REVIEW Alda  22. Tracking Only    PHSO: No  Total # of Interventions Recommended: 0    - Maintenance Safety Lab Monitoring #: 1    Total Interventions Accepted: 0  Time Spent (min): Humaira Ohara, VinceD

## 2020-09-24 ENCOUNTER — ANTI-COAG VISIT (OUTPATIENT)
Dept: PHARMACY | Age: 80
End: 2020-09-24
Payer: MEDICARE

## 2020-09-24 VITALS — TEMPERATURE: 98 F

## 2020-09-24 LAB
INTERNATIONAL NORMALIZATION RATIO, POC: 2.7
POC INR: 2.7 INDEX
PROTHROMBIN TIME, POC: 32.4 SECONDS (ref 10–14.3)

## 2020-09-24 PROCEDURE — 99211 OFF/OP EST MAY X REQ PHY/QHP: CPT

## 2020-09-24 PROCEDURE — 36416 COLLJ CAPILLARY BLOOD SPEC: CPT

## 2020-09-24 PROCEDURE — 85610 PROTHROMBIN TIME: CPT

## 2020-09-24 NOTE — PROGRESS NOTES
Medication Management Service  [facility]  [phone]    Visit Date: 9/24/2020   Subjective:       Marie Solorzano is a 67 y.o. female who presents to clinic today for anticoagulation monitoring and adjustment. Patient seen in clinic for warfarin management due to  Indication:   atrial fibrillation. INR goal: of 2.0-3.0. Duration of therapy: indefinite. Patient reports the following:   Adherent with regimen  Missed or extra doses:  None   Bleeding or thromboembolic side effects:  None  Significant medication changes:  None  Significant dietary changes: None  Significant alcohol or tobacco changes: None  Significant recent illness, disease state changes, or hospitalization:  None  Upcoming surgeries or procedures:  None  Falls: None           Assessment and Plan     PT/INR done in office per protocol. INR today is 2.7, therapeutic. Plan: Will continue current regimen of warfarin 5mg daily except 7.5mg on Tuesdays. Recheck INR in 4 week(s). Patient verbalized understanding of dosing directions and information discussed. Dosing schedule given to patient including phone number, appointment date, and time. Progress note sent to referring office. Patient acknowledges working in consult agreement with pharmacist as referred by his/her physician.       Electronically signed by Jane Lino, [facility] on 9/24/20 at 1:05 PM EDT    CLINICAL PHARMACY CONSULT: MED RECONCILIATION/REVIEW Alda  22. Tracking Only    PHSO: No  Total # of Interventions Recommended: 0    - Maintenance Safety Lab Monitoring #: 1    Total Interventions Accepted: 0  Time Spent (min): [address] [facility], [place]

## 2020-09-29 ENCOUNTER — OFFICE VISIT (OUTPATIENT)
Dept: CARDIOLOGY CLINIC | Age: 80
End: 2020-09-29
Payer: MEDICARE

## 2020-09-29 ENCOUNTER — TELEPHONE (OUTPATIENT)
Dept: CARDIOLOGY CLINIC | Age: 80
End: 2020-09-29

## 2020-09-29 VITALS
BODY MASS INDEX: 17.56 KG/M2 | DIASTOLIC BLOOD PRESSURE: 84 MMHG | HEIGHT: 65 IN | HEART RATE: 78 BPM | SYSTOLIC BLOOD PRESSURE: 128 MMHG | WEIGHT: 105.4 LBS

## 2020-09-29 PROCEDURE — 1090F PRES/ABSN URINE INCON ASSESS: CPT | Performed by: INTERNAL MEDICINE

## 2020-09-29 PROCEDURE — G8419 CALC BMI OUT NRM PARAM NOF/U: HCPCS | Performed by: INTERNAL MEDICINE

## 2020-09-29 PROCEDURE — G8427 DOCREV CUR MEDS BY ELIG CLIN: HCPCS | Performed by: INTERNAL MEDICINE

## 2020-09-29 PROCEDURE — G8400 PT W/DXA NO RESULTS DOC: HCPCS | Performed by: INTERNAL MEDICINE

## 2020-09-29 PROCEDURE — 99214 OFFICE O/P EST MOD 30 MIN: CPT | Performed by: INTERNAL MEDICINE

## 2020-09-29 PROCEDURE — 1123F ACP DISCUSS/DSCN MKR DOCD: CPT | Performed by: INTERNAL MEDICINE

## 2020-09-29 PROCEDURE — 4004F PT TOBACCO SCREEN RCVD TLK: CPT | Performed by: INTERNAL MEDICINE

## 2020-09-29 PROCEDURE — 4040F PNEUMOC VAC/ADMIN/RCVD: CPT | Performed by: INTERNAL MEDICINE

## 2020-09-29 NOTE — PROGRESS NOTES
[id number] Score for Atrial Fibrillation Stroke Risk   Risk   Factors  Component Value   C CHF No 0   H HTN Yes 1   A2 Age >= 76 Yes,  (75 y.o.) 2   D DM No 0   S2 Prior Stroke/TIA Yes 2   V Vascular Disease No 0   A Age 74-69 No,  (75 y.o.) 0   Sc Sex female 1    [id number]  Score  6   Score last updated 9/29/20 2:28 PM EDT

## 2020-09-29 NOTE — PROGRESS NOTES
CARDIOLOGY  NOTE    Chief Complaint: afib/ pace maker    HPI:   [facility] is a 67y.o. year old who has history as noted below. [facility]  had an echo which showed severe aortic and tricuspid regurgitation. She denies any ankle swelling or shortness of breath. She is currently undergoing BCG injections for bladder cancer. She is here with her daughter to discuss further management plan for her valvular disease. She denies any ankle swelling shortness of breath or chest pain   has Alzkrista's wears a colostomy she is busy taking care of him.sheis tired and fatigued a lot     She used to see Dr. Charito Dorantes. She has history of atrial fibrillation and pacemaker. Overall she continues to be active and independent although \"does not get out much\"   She has no shortness of breath, although her excess tolerance is declining. .  She does have history of permanent atrial fibrillation and she is on Coumadin. Current Outpatient Medications   Medication Sig Dispense Refill    warfarin (COUMADIN) 5 MG tablet Take 5 mg by mouth daily Except 7.5mg on Tuesdays      pravastatin (PRAVACHOL) 10 MG tablet Take 1 tablet by mouth daily 90 tablet 3    metoprolol tartrate (LOPRESSOR) 50 MG tablet Take 1 tablet by mouth 2 times daily 180 tablet 3    digoxin (LANOXIN) 125 MCG tablet Take 1 tablet by mouth daily 90 tablet 2    valsartan (DIOVAN) 320 MG tablet Take 320 mg by mouth daily       No current facility-administered medications for this visit. Allergies:   Simvastatin    Patient History:  Past Medical History:   Diagnosis Date    Abnormal urine     \"surgry cancelled on 2/6/2020- had abnormal urine so put me on antibiotics and did another urine at office 2/11/2020- have not heard if ok yet\"    Arthritis     Left hand    Atrial fibrillation ([facility] [address]     **Coumadin Clinic follows PT/INRs,along w/prescribing Coumadin. ** - Dr. Rachael Swartz [facility])     per old chart hx hematuria  · Musculoskeletal:  None  · Integumentary: No rash or pruritis  · Neurological: No TIA or stroke symptoms  · Psychiatric: No anxiety or depression  · Endocrine: No malaise, fatigue or temperature intolerance  · Hematologic/Lymphatic: No bleeding problems, blood clots or swollen lymph nodes  · Allergic/Immunologic: No nasal congestion or hives    Objective:      Physical Exam:  /84   Pulse 78   Ht 5' 5\" (1.651 m)   Wt 105 lb 6.4 oz (47.8 kg)   BMI 17.54 kg/m²   Wt Readings from Last 3 Encounters:   09/29/20 105 lb 6.4 oz (47.8 kg)   06/30/20 106 lb 12.8 oz (48.4 kg)   05/05/20 108 lb (49 kg)     Body mass index is 17.54 kg/m². Vitals:    09/29/20 1429   BP: 128/84   Pulse: 78        General Appearance:  No distress, conversant  Constitutional:  Well developed, Well nourished, No acute distress, Non-toxic appearance. HENT:  Normocephalic, Atraumatic, Bilateral external ears normal, Oropharynx moist, No oral exudates, Nose normal. Neck- Normal range of motion, No tenderness, Supple, No stridor,no apical-carotid delay  Eyes:  PERRL, EOMI, Conjunctiva normal, No discharge. Respiratory:  Normal breath sounds, No respiratory distress, No wheezing, No chest tenderness. ,no use of accessory muscles, NO crackles  Cardiovascular: (PMI) apex non displaced,no lifts no thrills,S1 and S2 audible, systolic 2 x 6 murmur, No signs of ankle edema, or volume overload, No evidence of JVD, No crackles  GI:  Bowel sounds normal, Soft, No tenderness, No masses, No gross visceromegaly   :  No costovertebral angle tenderness   Musculoskeletal:  No edema, no tenderness, no deformities.  Back- no tenderness  Integument:  Bilateral lower extremity chronic Coumadin changes  Lymphatic:  No lymphadenopathy noted   Neurologic:  Alert & oriented x 3, CN 2-12 normal, normal motor function, normal sensory function, no focal deficits noted   Psychiatric:  Speech and behavior appropriate       Medical decision making and Data review:  DATA:  No results found for: TROPONINT  BNP:  No results found for: PROBNP  PT/INR:  No results found for: PTINR  No results found for: LABA1C  Lab Results   Component Value Date    CHOL 199 05/27/2020    TRIG 96 05/27/2020    HDL 52 05/27/2020    LDLCALC 125 (H) 10/23/2014    LDLDIRECT 139 (H) 05/27/2020     Lab Results   Component Value Date    ALT 10 05/27/2020    AST 16 05/27/2020     TSH: No results found for: TSH  Lab Results   Component Value Date    AST 16 05/27/2020    ALT 10 05/27/2020    BILIDIR 0.3 (H) 05/06/2011    BILITOT 1.4 (H) 05/27/2020    ALKPHOS 85 05/27/2020     No results found for: PROBNP  No results found for: LABA1C  Lab Results   Component Value Date    WBC 6.8 01/30/2020    HGB 15.9 01/30/2020    HCT 49.5 (H) 01/30/2020     01/30/2020     Echo 6/16/2020  Summary   Left ventricular systolic function is normal with an ejection fraction of   55-60%. Mild bilateral atrial dilatation. Sclerotic, but non-stenotic aortic valve. Sclerotic mitral valve. Severe Aortic regurgitation   Severe Tricuspid regurgitation   mild mitral regurgitation. Right ventricular systolic pressure of 47 mmHg consistent with mild to   moderate pulmonary hypertension. No evidence of pericardial effusion. All labs, medications and tests reviewed by myself including data and history from outside source , patient and available family . Assessment & Plan:      1. Chronic atrial fibrillation    2. Hyperlipidemia, unspecified hyperlipidemia type    3. Essential hypertension    4. Nonrheumatic aortic valve insufficiency    5. Nonrheumatic tricuspid valve regurgitation    6. PAF (paroxysmal atrial fibrillation) ([facility])    7. Statin intolerance    8. TIA (transient ischemic attack)    9. Tobacco abuse    10. Atrial fibrillation, unspecified type ([address]    11. VHD (valvular heart disease)    12. Cardiac pacemaker in situ    13.  Cerebral infarction, unspecified mechanism ([facility])         VHD (valvular 10/29/2020). Please note this report has been partially produced using speech recognition software and may contain errors related to that system including errors in grammar, punctuation, and spelling, as well as words and phrases that may be inappropriate.  If there are any questions or concerns please feel free to contact the dictating provider for clarification.

## 2020-09-29 NOTE — LETTER
Lyndon Lees  1940  [id number]    Have you had any Chest Pain - No    Have you had any Shortness of Breath - No    Have you had any dizziness - No      Have you had any palpitations - No      Do you have any edema - swelling in ankles    If Yes - CHECK TO SEE IF THE EDEMA IS PITTING  How long have they been having edema - Months  If Yes - Have they worn compression stockings No      Do you have a surgery or procedure scheduled in the near future - No

## 2020-09-29 NOTE — TELEPHONE ENCOUNTER
Pt here in office & educated on VEENA for Dx: TIMO DOE, scheduled for 10/15/20 @ 9 AM, w/arrival @ 8 AM, @ [facility]; risks explained; & consents signed. Pre-admission orders given to pt. COVID testing @ [facility] on 10/8 Instructions given to pt to:  NPO after midnight night before procedure; Call hospital @ [phone] to pre-register; May take rest of morning meds. am of procedure; & pt voiced understanding. Copies of consent, pre-testing orders, & info. sheet scanned into chart.

## 2020-10-08 ENCOUNTER — NURSE ONLY (OUTPATIENT)
Dept: CARDIOLOGY CLINIC | Age: 80
End: 2020-10-08
Payer: MEDICARE

## 2020-10-08 ENCOUNTER — HOSPITAL ENCOUNTER (OUTPATIENT)
Age: 80
Setting detail: SPECIMEN
Discharge: HOME | End: 2020-10-08
Payer: MEDICARE

## 2020-10-08 VITALS — TEMPERATURE: 97.1 F

## 2020-10-08 PROCEDURE — U0002 COVID-19 LAB TEST NON-CDC: HCPCS

## 2020-10-08 PROCEDURE — 99211 OFF/OP EST MAY X REQ PHY/QHP: CPT | Performed by: INTERNAL MEDICINE

## 2020-10-10 LAB
SARS-COV-2: NOT DETECTED
SOURCE: NORMAL

## 2020-10-14 ENCOUNTER — TELEPHONE (OUTPATIENT)
Dept: CARDIOLOGY CLINIC | Age: 80
End: 2020-10-14

## 2020-10-15 ENCOUNTER — HOSPITAL ENCOUNTER (OUTPATIENT)
Dept: NON INVASIVE DIAGNOSTICS | Age: 80
Discharge: HOME | End: 2020-10-15
Payer: MEDICARE

## 2020-10-15 VITALS
HEART RATE: 69 BPM | DIASTOLIC BLOOD PRESSURE: 59 MMHG | SYSTOLIC BLOOD PRESSURE: 159 MMHG | OXYGEN SATURATION: 98 % | RESPIRATION RATE: 20 BRPM

## 2020-10-15 LAB
LV EF: 53 %
LVEF MODALITY: NORMAL

## 2020-10-15 PROCEDURE — 93312 ECHO TRANSESOPHAGEAL: CPT | Performed by: INTERNAL MEDICINE

## 2020-10-15 PROCEDURE — 93312 ECHO TRANSESOPHAGEAL: CPT

## 2020-10-15 PROCEDURE — 7100000001 HC PACU RECOVERY - ADDTL 15 MIN

## 2020-10-15 PROCEDURE — 7100000000 HC PACU RECOVERY - FIRST 15 MIN

## 2020-10-15 NOTE — PROGRESS NOTES
Patient is alert and appropriate, discussed DC instructions, patient signed and copy given. Denies any questions. IV DCd.  Escorted to daughter's car per [facility]

## 2020-10-15 NOTE — H&P
CARDIOLOGY  NOTE    Chief Complaint: afib/ pace maker    HPI:   Gia Dunham is a 67y.o. year old who has history as noted below. Gia Dunham  had an echo which showed severe aortic and tricuspid regurgitation. She denies any ankle swelling or shortness of breath. She is currently undergoing BCG injections for bladder cancer. She is here with her daughter to discuss further management plan for her valvular disease. She denies any ankle swelling shortness of breath or chest pain   has Alzkrista's wears a colostomy she is busy taking care of him.sheis tired and fatigued a lot     She used to see Dr. Gladis Middleton. She has history of atrial fibrillation and pacemaker. Overall she continues to be active and independent although \"does not get out much\"   She has no shortness of breath, although her excess tolerance is declining. .  She does have history of permanent atrial fibrillation and she is on Coumadin. Current Outpatient Medications   Medication Sig Dispense Refill    warfarin (COUMADIN) 5 MG tablet Take 5 mg by mouth daily Except 7.5mg on Tuesdays      pravastatin (PRAVACHOL) 10 MG tablet Take 1 tablet by mouth daily 90 tablet 3    metoprolol tartrate (LOPRESSOR) 50 MG tablet Take 1 tablet by mouth 2 times daily 180 tablet 3    digoxin (LANOXIN) 125 MCG tablet Take 1 tablet by mouth daily 90 tablet 2    valsartan (DIOVAN) 320 MG tablet Take 320 mg by mouth daily       No current facility-administered medications for this encounter. Allergies:   Simvastatin    Patient History:  Past Medical History:   Diagnosis Date    Abnormal urine     \"surgry cancelled on 2/6/2020- had abnormal urine so put me on antibiotics and did another urine at office 2/11/2020- have not heard if ok yet\"    Arthritis     Left hand    Atrial fibrillation ([facility] [address]     **Coumadin Clinic follows PT/INRs,along w/prescribing Coumadin. ** - Dr. Rico Dowling [facility])     per old chart hx bladder CA in 2012-     H/O echocardiogram 06/16/2020    EF 55-60%, Severe AR &TR, Mild-Mod PHTN.  Hx of echocardiogram 04/05/2017    EF50-55%,moderate aortic regurg,mild to moderate pulmonary HTN, moderate tricusid regurg    Hyperlipidemia     Hypertension     Follows with PCP    Pneumonia 1994    SSS (sick sinus syndrome) ([facility] [address]     TIA (transient ischemic attack) 06/24/2012    Came to ER for evaluation - no residual effects per pt 2/18/2020-\"I lost feeling in my left side- lasted a couple of hours\"    Wears glasses     to read     Past Surgical History:   Procedure Laterality Date   [place] 2016    cyst removed @ [address] [facility]  ?? Normal exam per pt - Dr. Antione Coker 2/20/2020    CYSTOSCOPY TRANSURETHRAL RESECTION BLADDER performed by Nereyda De La Torre MD at [facility] 3    collasped lung    PACEMAKER INSERTION  10/08/2008    [employer]tronic - dr. Delilah Thorne  (new battery) 2018?  TUBAL LIGATION  1974     Family History   Problem Relation Age of Onset    High Cholesterol Mother     Cancer Mother         \"cancer in her stomach- lymphoma\"    Diabetes Father     Heart Disease Brother      Social History     Tobacco Use    Smoking status: Current Every Day Smoker     Packs/day: 0.50     Years: 67.00     Pack years: 33.50     Types: Cigarettes     Start date: 1953    Smokeless tobacco: Never Used    Tobacco comment: 5 or 6 cigarettes a day ( per pt 2/20/2020   Substance Use Topics    Alcohol use: No        Review of Systems:   · Constitutional: No Fever or Weight Loss   · Eyes: No Decreased Vision  · ENT: No Headaches, Hearing Loss or Vertigo  · Cardiovascular: as per note above   · Respiratory: No cough or wheezing and as per note above.    · Gastrointestinal: No abdominal pain, appetite loss, blood in stools, constipation, diarrhea or heartburn  · Genitourinary: No dysuria, trouble voiding, or hematuria  · Musculoskeletal:  None  · Integumentary: No rash or pruritis  · Neurological: No TIA or stroke symptoms  · Psychiatric: No anxiety or depression  · Endocrine: No malaise, fatigue or temperature intolerance  · Hematologic/Lymphatic: No bleeding problems, blood clots or swollen lymph nodes  · Allergic/Immunologic: No nasal congestion or hives    Objective:      Physical Exam:  BP (!) 159/59   Pulse 69   Resp 20   SpO2 98%   Wt Readings from Last 3 Encounters:   09/29/20 105 lb 6.4 oz (47.8 kg)   06/30/20 106 lb 12.8 oz (48.4 kg)   05/05/20 108 lb (49 kg)     There is no height or weight on file to calculate BMI. Vitals:    10/15/20 1033   BP: (!) 159/59   Pulse: 69   Resp: 20   SpO2: 98%        General Appearance:  No distress, conversant  Constitutional:  Well developed, Well nourished, No acute distress, Non-toxic appearance. HENT:  Normocephalic, Atraumatic, Bilateral external ears normal, Oropharynx moist, No oral exudates, Nose normal. Neck- Normal range of motion, No tenderness, Supple, No stridor,no apical-carotid delay  Eyes:  PERRL, EOMI, Conjunctiva normal, No discharge. Respiratory:  Normal breath sounds, No respiratory distress, No wheezing, No chest tenderness. ,no use of accessory muscles, NO crackles  Cardiovascular: (PMI) apex non displaced,no lifts no thrills,S1 and S2 audible, systolic 2 x 6 murmur, No signs of ankle edema, or volume overload, No evidence of JVD, No crackles  GI:  Bowel sounds normal, Soft, No tenderness, No masses, No gross visceromegaly   :  No costovertebral angle tenderness   Musculoskeletal:  No edema, no tenderness, no deformities.  Back- no tenderness  Integument:  Bilateral lower extremity chronic Coumadin changes  Lymphatic:  No lymphadenopathy noted   Neurologic:  Alert & oriented x 3, CN 2-12 normal, normal motor function, normal sensory function, no focal deficits noted   Psychiatric:  Speech and behavior appropriate       Medical decision making and Data review:  DATA:  No results found for: TROPONINT  BNP:  No results found for: PROBNP  PT/INR:  No results found for: PTINR  No results found for: LABA1C  Lab Results   Component Value Date    CHOL 199 05/27/2020    TRIG 96 05/27/2020    HDL 52 05/27/2020    LDLCALC 125 (H) 10/23/2014    LDLDIRECT 139 (H) 05/27/2020     Lab Results   Component Value Date    ALT 10 05/27/2020    AST 16 05/27/2020     TSH: No results found for: TSH  Lab Results   Component Value Date    AST 16 05/27/2020    ALT 10 05/27/2020    BILIDIR 0.3 (H) 05/06/2011    BILITOT 1.4 (H) 05/27/2020    ALKPHOS 85 05/27/2020     No results found for: PROBNP  No results found for: LABA1C  Lab Results   Component Value Date    WBC 6.8 01/30/2020    HGB 15.9 01/30/2020    HCT 49.5 (H) 01/30/2020     01/30/2020     Echo 6/16/2020  Summary   Left ventricular systolic function is normal with an ejection fraction of   55-60%. Mild bilateral atrial dilatation. Sclerotic, but non-stenotic aortic valve. Sclerotic mitral valve. Severe Aortic regurgitation   Severe Tricuspid regurgitation   mild mitral regurgitation. Right ventricular systolic pressure of 47 mmHg consistent with mild to   moderate pulmonary hypertension. No evidence of pericardial effusion. All labs, medications and tests reviewed by myself including data and history from outside source , patient and available family . Assessment & Plan:      No diagnosis found. VHD (valvular heart disease)  Her echo shows significant aortic and tricuspid regurgitation I have advised her to undergo VEENA she may need intervention for these valves she will decide when it is appropriate for her when she has support at home due to her  and we will plan VEENA. She does not have any signs of heart failure or volume overload  Went over the pathophysiology including signs and symptoms and eventual plan for valvular disease  We will plan VEENA.  Alternates and risk of the procedure were dicussed in detail  Patient is in agreement to proceed. Mallampati is 2  ASA is 2           Counseled extensively and medication compliance urged. We discussed that for the  prevention of ASCVD our  goal is aggressive risk modification. Patient is encouraged to exercise even a brisk walk for 30 minutes  at least 3 to 4 times a week   Various goals were discussed and questions answered. Continue current medications. Appropriate prescriptions are addressed and refills ordered. Questions answered and patient verbalizes understanding. Call for any problems, questions, or concerns. Continue all other medications of all above medical condition listed as is. No follow-ups on file. Please note this report has been partially produced using speech recognition software and may contain errors related to that system including errors in grammar, punctuation, and spelling, as well as words and phrases that may be inappropriate.  If there are any questions or concerns please feel free to contact the dictating provider for clarification.

## 2020-10-19 RX ORDER — WARFARIN SODIUM 5 MG/1
TABLET ORAL
Qty: 96 TABLET | Refills: 2 | Status: SHIPPED
Start: 2020-10-19 | End: 2020-10-22 | Stop reason: DRUGHIGH

## 2020-10-22 ENCOUNTER — ANTI-COAG VISIT (OUTPATIENT)
Dept: PHARMACY | Age: 80
End: 2020-10-22
Payer: MEDICARE

## 2020-10-22 VITALS — TEMPERATURE: 97.7 F

## 2020-10-22 LAB
INTERNATIONAL NORMALIZATION RATIO, POC: 3.2
POC INR: 3.2 INDEX
PROTHROMBIN TIME, POC: 38.2 SECONDS (ref 10–14.3)

## 2020-10-22 PROCEDURE — 36416 COLLJ CAPILLARY BLOOD SPEC: CPT

## 2020-10-22 PROCEDURE — 99212 OFFICE O/P EST SF 10 MIN: CPT

## 2020-10-22 PROCEDURE — 85610 PROTHROMBIN TIME: CPT

## 2020-10-22 RX ORDER — WARFARIN SODIUM 5 MG/1
5 TABLET ORAL DAILY
COMMUNITY
End: 2021-03-08 | Stop reason: DRUGHIGH

## 2020-10-22 NOTE — PROGRESS NOTES
Medication Management Service  [facility]  [phone]    Visit Date: 10/22/2020   Subjective:       Michele Young is a 67 y.o. female who presents to clinic today for anticoagulation monitoring and adjustment. Patient seen in clinic for warfarin management due to  Indication:   atrial fibrillation and TIA. INR goal: of 2.0-3.0. Duration of therapy: indefinite. Patient reports the following:   Adherent with regimen  Missed or extra doses:  None   Bleeding or thromboembolic side effects:  None  Significant medication changes:  None  Significant dietary changes: None  Significant alcohol or tobacco changes: None  Significant recent illness, disease state changes, or hospitalization:  None  Upcoming surgeries or procedures:  None  Falls: None           Assessment and Plan     PT/INR done in office per protocol. INR today is 3.2, supratherapeutic. Plan:  Decrease weekly dose ~7% to warfarin 5mg daily. Recheck INR in 2 week(s). Patient verbalized understanding of dosing directions and information discussed. Dosing schedule given to patient including phone number, appointment date, and time. Progress note sent to referring office. Patient acknowledges working in consult agreement with pharmacist as referred by his/her physician.       Electronically signed by Lex Cisneros, [address] [place] [address] on 10/22/20 at 1:30 PM EDT    CLINICAL PHARMACY CONSULT: [facility] Tr[facility] Only    PHSO: No  Total # of Interventions Recommended: 0  - Decreased Dose #: 1  - Maintenance Safety Lab Monitoring #: 1    Total Interventions Accepted: 1  Time Spent (min): Portillo Elkins PharmD

## 2020-10-27 ENCOUNTER — OFFICE VISIT (OUTPATIENT)
Dept: CARDIOLOGY CLINIC | Age: 80
End: 2020-10-27
Payer: MEDICARE

## 2020-10-27 VITALS
SYSTOLIC BLOOD PRESSURE: 180 MMHG | BODY MASS INDEX: 17.73 KG/M2 | DIASTOLIC BLOOD PRESSURE: 90 MMHG | HEIGHT: 65 IN | HEART RATE: 72 BPM | WEIGHT: 106.4 LBS

## 2020-10-27 PROCEDURE — 1123F ACP DISCUSS/DSCN MKR DOCD: CPT | Performed by: INTERNAL MEDICINE

## 2020-10-27 PROCEDURE — 93279 PRGRMG DEV EVAL PM/LDLS PM: CPT | Performed by: INTERNAL MEDICINE

## 2020-10-27 PROCEDURE — G8427 DOCREV CUR MEDS BY ELIG CLIN: HCPCS | Performed by: INTERNAL MEDICINE

## 2020-10-27 PROCEDURE — G8419 CALC BMI OUT NRM PARAM NOF/U: HCPCS | Performed by: INTERNAL MEDICINE

## 2020-10-27 PROCEDURE — 99214 OFFICE O/P EST MOD 30 MIN: CPT | Performed by: INTERNAL MEDICINE

## 2020-10-27 PROCEDURE — 1090F PRES/ABSN URINE INCON ASSESS: CPT | Performed by: INTERNAL MEDICINE

## 2020-10-27 PROCEDURE — 93000 ELECTROCARDIOGRAM COMPLETE: CPT | Performed by: INTERNAL MEDICINE

## 2020-10-27 PROCEDURE — G8400 PT W/DXA NO RESULTS DOC: HCPCS | Performed by: INTERNAL MEDICINE

## 2020-10-27 PROCEDURE — 4040F PNEUMOC VAC/ADMIN/RCVD: CPT | Performed by: INTERNAL MEDICINE

## 2020-10-27 PROCEDURE — G8484 FLU IMMUNIZE NO ADMIN: HCPCS | Performed by: INTERNAL MEDICINE

## 2020-10-27 PROCEDURE — 4004F PT TOBACCO SCREEN RCVD TLK: CPT | Performed by: INTERNAL MEDICINE

## 2020-10-27 RX ORDER — NIFEDIPINE 30 MG/1
30 TABLET, EXTENDED RELEASE ORAL DAILY
Qty: 30 TABLET | Refills: 0 | Status: SHIPPED | OUTPATIENT
Start: 2020-10-27 | End: 2020-11-23

## 2020-10-27 NOTE — LETTER
[place]    Dr. Barnes Bun  1940  [id number]    Have you had any Chest Pain - No       Have you had any Shortness of Breath - Yes  If Yes - When on exertion       Have you had any dizziness - Yes, ongoing, occasional, unchanged  If Yes DO ORTHOSTATIC BP - when do you feel dizzy turning her head   How long does it last minutes      Have you had any palpitations - No     Is the patient on any of the following medications - NONE  If Yes DO EKG    Do you have any edema - swelling in ankles    If Yes - CHECK TO SEE IF THE EDEMA IS PITTING  How long have they been having edema - Months  If Yes - Have they worn compression stockings No    Check Venous \"LEG PROBLEM Questionnaire\"    Do you have a surgery or procedure scheduled in the near future - No  If Yes- DO EKG      Ask patient if they want to sign up for MyChart if they are not already signed up    Check to see if we have an E-MAIL on file for the patient    Check medication list thoroughly!!!  BE SURE TO ASK PATIENT IF THEY NEED MEDICATION REFILLS    At check out add to every patient's \"wrap up\" the following dot phrase AFTERHOURSEDUCATION and ensure we explain this to our patients

## 2020-10-27 NOTE — PROGRESS NOTES
 Arthritis     Left hand    Atrial fibrillation (HCC)     **Coumadin Clinic follows PT/INRs,along w/prescribing Coumadin. ** - Dr. Nikita Jansen [facility])     per old chart hx bladder CA in 2012-     H/O echocardiogram 06/16/2020    EF 55-60%, Severe AR &TR, Mild-Mod PHTN.  Hx of echocardiogram 04/05/2017    EF50-55%,moderate aortic regurg,mild to moderate pulmonary HTN, moderate tricusid regurg    Hyperlipidemia     Hypertension     Follows with PCP    Pneumonia 1994    SSS (sick sinus syndrome) ([address]     TIA (transient ischemic attack) 06/24/2012    Came to ER for evaluation - no residual effects per pt 2/18/2020-\"I lost feeling in my left side- lasted a couple of hours\"    Wears glasses     to read     Past Surgical History:   Procedure Laterality Date   [place] 2016    cyst removed @ [facility]  ?? Normal exam per pt - Dr. Ova Libman 2/20/2020    CYSTOSCOPY TRANSURETHRAL RESECTION BLADDER performed by Alysa Gray MD at [facility] 3    collasped lung    PACEMAKER INSERTION  10/08/2008    [employer] - dr. Sai Gleason  (new battery) 2018?  TUBAL LIGATION  1974     Family History   Problem Relation Age of Onset    High Cholesterol Mother     Cancer Mother         \"cancer in her stomach- lymphoma\"    Diabetes Father     Heart Disease Brother      Social History     Tobacco Use    Smoking status: Current Every Day Smoker     Packs/day: 0.50     Years: 67.00     Pack years: 33.50     Types: Cigarettes     Start date: 1953    Smokeless tobacco: Never Used    Tobacco comment: 5 or 6 cigarettes a day ( per pt 2/20/2020   Substance Use Topics    Alcohol use: No        Review of Systems:   · Constitutional: No Fever or Weight Loss   · Eyes: No Decreased Vision  · ENT: No Headaches, Hearing Loss or Vertigo  · Cardiovascular: as per note above   · Respiratory: No cough or wheezing and as per note above. changes  Lymphatic:  No lymphadenopathy noted   Neurologic:  Alert & oriented x 3, CN 2-12 normal, normal motor function, normal sensory function, no focal deficits noted   Psychiatric:  Speech and behavior appropriate       Medical decision making and Data review:  DATA:  No results found for: TROPONINT  BNP:  No results found for: PROBNP  PT/INR:  No results found for: PTINR  No results found for: LABA1C  Lab Results   Component Value Date    CHOL 199 05/27/2020    TRIG 96 05/27/2020    HDL 52 05/27/2020    LDLCALC 125 (H) 10/23/2014    LDLDIRECT 139 (H) 05/27/2020     Lab Results   Component Value Date    ALT 10 05/27/2020    AST 16 05/27/2020     TSH: No results found for: TSH  Lab Results   Component Value Date    AST 16 05/27/2020    ALT 10 05/27/2020    BILIDIR 0.3 (H) 05/06/2011    BILITOT 1.4 (H) 05/27/2020    ALKPHOS 85 05/27/2020     No results found for: PROBNP  No results found for: LABA1C  Lab Results   Component Value Date    WBC 6.8 01/30/2020    HGB 15.9 01/30/2020    HCT 49.5 (H) 01/30/2020     01/30/2020     Echo 6/16/2020  Summary   Left ventricular systolic function is normal with an ejection fraction of   55-60%. Mild bilateral atrial dilatation. Sclerotic, but non-stenotic aortic valve. Sclerotic mitral valve. Severe Aortic regurgitation   Severe Tricuspid regurgitation   mild mitral regurgitation. Right ventricular systolic pressure of 47 mmHg consistent with mild to   moderate pulmonary hypertension. No evidence of pericardial effusion. VEENA 10/15/2020  Summary   Left ventricular systolic function is normal.   Ejection fraction is visually estimated at 50-55%. Moderately dilated right atrium. Severe aortic regurgitation is noted. Vena contracta measures 0.49 CM   The regurgitant jet occupies 50 % of LVOT   Sclerotic, but non-stenotic aortic valve. Moderate to severe tricuspid regurgitation is present. Tricuspid valve prolapse is present.    Mild prolapse of the anterior mitral valve leaflet. There was no thrombus noted in the left atrial appendage. Negative bubble study. No PFO or ASD noted. All labs, medications and tests reviewed by myself including data and history from outside source , patient and available family . Assessment & Plan:      1. PAF (paroxysmal atrial fibrillation) (HCC)    2. Cardiac pacemaker in situ    3. Cerebral infarction, unspecified mechanism ([address]    4. Chronic atrial fibrillation (HCC)    5. Nonrheumatic aortic valve insufficiency    6. Nonrheumatic tricuspid valve regurgitation    7. TIA (transient ischemic attack)    8. Tobacco abuse         VHD (valvular heart disease)    She does not have any signs of heart failure or volume overload. On echo LV is not dilated  WE Went over the pathophysiology including signs and symptoms and eventual plan for valvular disease. I explained to her that she may need valve surgery but since she is not symptomatic and the dimensions of left ventricular not dilated as yet we will try to hold off on any surgical interventions. She would prefer not to have any surgery. She is certainly very high risk for any open aortic valve replacement or SAVR. Repeat echo in 6 months to evaluate LV dimensions      PAF (paroxysmal atrial fibrillation) ([address]  She has been anticoagulation with Coumadin for many years. In the past we have talked about DOACS either xarelto or eliquis and the benefits over Coumadin including but there is profile and improved  Efficacy over Coumadin. [employer] She wants to continue with coumadin  She can hold coumadin for urology procedures if needed      Pacemaker  Pacemaker was interrogated  results were reviewed with patient 11year-old battery life left 46% V paced, 53 % sensed    Cerebral infarction ([address]  History of A. fib and CVA. She's currently on Coumadin    Hypertension  Blood pressure is well-controlled    Smoking       Dyslipidemia :  All available lab work was reviewed. Patient was advised to repeat lab work before next visit      Counseled extensively and medication compliance urged. We discussed that for the  prevention of ASCVD our  goal is aggressive risk modification. Patient is encouraged to exercise even a brisk walk for 30 minutes  at least 3 to 4 times a week   Various goals were discussed and questions answered. Continue current medications. Appropriate prescriptions are addressed and refills ordered. Questions answered and patient verbalizes understanding. Call for any problems, questions, or concerns. Continue all other medications of all above medical condition listed as is. Return in about 6 months (around 4/27/2021). Please note this report has been partially produced using speech recognition software and may contain errors related to that system including errors in grammar, punctuation, and spelling, as well as words and phrases that may be inappropriate.  If there are any questions or concerns please feel free to contact the dictating provider for clarification.

## 2020-11-05 ENCOUNTER — ANTI-COAG VISIT (OUTPATIENT)
Dept: PHARMACY | Age: 80
End: 2020-11-05
Payer: MEDICARE

## 2020-11-05 VITALS — TEMPERATURE: 97.3 F

## 2020-11-05 LAB
INTERNATIONAL NORMALIZATION RATIO, POC: 2.8
POC INR: 2.8 INDEX
PROTHROMBIN TIME, POC: 33.2 SECONDS (ref 10–14.3)

## 2020-11-05 PROCEDURE — 99211 OFF/OP EST MAY X REQ PHY/QHP: CPT

## 2020-11-05 PROCEDURE — 85610 PROTHROMBIN TIME: CPT

## 2020-11-05 PROCEDURE — 36416 COLLJ CAPILLARY BLOOD SPEC: CPT

## 2020-11-23 RX ORDER — NIFEDIPINE 30 MG/1
30 TABLET, EXTENDED RELEASE ORAL DAILY
Qty: 30 TABLET | Refills: 3 | Status: SHIPPED | OUTPATIENT
Start: 2020-11-23 | End: 2021-03-23

## 2020-12-03 ENCOUNTER — ANTI-COAG VISIT (OUTPATIENT)
Dept: PHARMACY | Age: 80
End: 2020-12-03
Payer: MEDICARE

## 2020-12-03 VITALS — TEMPERATURE: 97 F

## 2020-12-03 LAB
INTERNATIONAL NORMALIZATION RATIO, POC: 2.6
POC INR: 2.6 INDEX
PROTHROMBIN TIME, POC: 31.7 SECONDS (ref 10–14.3)

## 2020-12-03 PROCEDURE — 99211 OFF/OP EST MAY X REQ PHY/QHP: CPT

## 2020-12-03 PROCEDURE — 85610 PROTHROMBIN TIME: CPT

## 2020-12-03 PROCEDURE — 36416 COLLJ CAPILLARY BLOOD SPEC: CPT

## 2020-12-03 NOTE — PROGRESS NOTES
Medication Management Service  [facility]  [phone]    Visit Date: 12/3/2020   Subjective:       Duong Monterroso is a 67 y.o. female who presents to clinic today for anticoagulation monitoring and adjustment. Patient seen in clinic for warfarin management due to  Indication:   atrial fibrillation and TIA. INR goal: of 2.0-3.0. Duration of therapy: indefinite. Patient reports the following:   Adherent with regimen  Missed or extra doses:  None   Bleeding or thromboembolic side effects:  Some episodes of blood in urine. Urology appt 12/23- she will call urology sooner as she suspects possible UTI  Significant medication changes:  None  Significant dietary changes: None  Significant alcohol or tobacco changes: None  Significant recent illness, disease state changes, or hospitalization:  None  Upcoming surgeries or procedures:  None  Falls: None           Assessment and Plan     PT/INR done in office per protocol. INR today is 2.6, therapeutic. Plan: Will continue current regimen of warfarin 5mg daily. Advised to call if any antibiotics for UTI started. Recheck INR in 5 week(s). Patient verbalized understanding of dosing directions and information discussed. Dosing schedule given to patient including phone number, appointment date, and time. Progress note sent to referring office. Patient acknowledges working in consult agreement with pharmacist as referred by his/her physician.       Electronically signed by Gibson Contreras, [address] [facility] on 12/3/20 at 10:28 AM EST    CLINICAL PHARMACY CONSULT: MED RECONCILIATION/REVIEW [facility]  22. Tracking Only    PHSO: No  Total # of Interventions Recommended: 0    - Maintenance Safety Lab Monitoring #: 1    Total Interventions Accepted: 0  Time Spent (min): Kylie Morgan PharmD

## 2021-01-04 ENCOUNTER — TELEPHONE (OUTPATIENT)
Dept: CARDIOLOGY CLINIC | Age: 81
End: 2021-01-04

## 2021-01-07 ENCOUNTER — TELEPHONE (OUTPATIENT)
Dept: PHARMACY | Age: 81
End: 2021-01-07

## 2021-01-07 DIAGNOSIS — I48.20 CHRONIC ATRIAL FIBRILLATION (HCC): ICD-10-CM

## 2021-01-07 DIAGNOSIS — G45.9 TIA (TRANSIENT ISCHEMIC ATTACK): ICD-10-CM

## 2021-01-07 NOTE — TELEPHONE ENCOUNTER
Patient left voicemail to cancel appointment. Patient reports chills, aches today. Provided info on [address] [place] [address] testing site. Patient states she lefy message for her PCP. Patient was on antibiotics from urologist: Augmentin 875mg caused diarrhea with black stools and she stopped after 3 days. Now on Nitrofurtoin. Scheduled for 1/11 pending symptoms.      CLINICAL PHARMACY CONSULT: MED RECONCILIATION/REVIEW ADDENDUM    For Pharmacy Admin Tracking Only    PHSO: No   Time Spent (min): Adelita Carbajal [id number], PharmD

## 2021-01-11 ENCOUNTER — ANTI-COAG VISIT (OUTPATIENT)
Dept: PHARMACY | Age: 81
End: 2021-01-11
Payer: MEDICARE

## 2021-01-11 VITALS — TEMPERATURE: 97.9 F

## 2021-01-11 DIAGNOSIS — G45.9 TIA (TRANSIENT ISCHEMIC ATTACK): ICD-10-CM

## 2021-01-11 DIAGNOSIS — I48.20 CHRONIC ATRIAL FIBRILLATION (HCC): ICD-10-CM

## 2021-01-11 LAB
INTERNATIONAL NORMALIZATION RATIO, POC: 3.1
POC INR: 3.1 INDEX
PROTHROMBIN TIME, POC: 37.7 SECONDS (ref 10–14.3)

## 2021-01-11 PROCEDURE — 99212 OFFICE O/P EST SF 10 MIN: CPT

## 2021-01-11 PROCEDURE — 36416 COLLJ CAPILLARY BLOOD SPEC: CPT

## 2021-01-11 PROCEDURE — 85610 PROTHROMBIN TIME: CPT

## 2021-01-11 NOTE — PROGRESS NOTES
Medication Management Service  [facility]  [phone]    Visit Date: 1/11/2021   Subjective:       Issa Blackburn is a 79 y.o. female who presents to clinic today for anticoagulation monitoring and adjustment. Patient seen in clinic for warfarin management due to  Indication:   atrial fibrillation. INR goal: of 2.0-3.0. Duration of therapy: indefinite. Patient reports the following:   Adherent with regimen  Missed or extra doses:  None   Bleeding or thromboembolic side effects:  None  Significant medication changes:  Macrobid  Significant dietary changes: None  Significant alcohol or tobacco changes: None  Significant recent illness, disease state changes, or hospitalization:  None  Upcoming surgeries or procedures:  None  Falls: None           Assessment and Plan     PT/INR done in office per protocol. INR today is 3.1, therapeutic. Patient reports she had not been feeling well and had diarrhea with black stools. Patient started Augmentin but then switched to Elida Caroline. Patient states she told urologist about black stools. Advised patient to follow-up with Dr. Brooke Murguai for black stools and notify me if it occurs again. Plan: Decrease weekly dose ~7% to warfarin 5mg daily except 2.5mg on Tuesdays. Recheck INR in 3 week(s). 2 weeks recommended but patient refuses until 2/1/20. Patient verbalized understanding of dosing directions and information discussed. Dosing schedule given to patient including phone number, appointment date, and time. Progress note sent to referring office. Patient acknowledges working in consult agreement with pharmacist as referred by his/her physician.       Electronically signed by Phu Lima, [facility] on 1/11/21 at 1:16 PM EST    CLINICAL PHARMACY CONSULT: MED RECONCILIATION/REVIEW ADDENDUM    For Pharmacy Admin Tracking Only    PHSO: No  Total # of Interventions Recommended: 1  - Decreased Dose #: 1 - Maintenance Safety Lab Monitoring #: 1  Total Interventions Accepted: 1  Time Spent (min): 15    Vince RenteriaD

## 2021-01-18 ENCOUNTER — TELEPHONE (OUTPATIENT)
Dept: CARDIOLOGY CLINIC | Age: 81
End: 2021-01-18

## 2021-01-18 NOTE — LETTER
[address]  [address] [address]  Phone: [phone]  Fax: [phone]            January 18, 2021    [address] [facility] [address]      Dear Luis: This is your CARELINK schedule. Please steff your calendar with these dates. You can do your checks anytime during the scheduled day. Since we do not do reminder calls, it will be your responsibility to perform the checks on the day it is scheduled. If you have any questions or concerns, please call and ask for Staci Emanuel at [phone].

## 2021-02-01 ENCOUNTER — TELEPHONE (OUTPATIENT)
Dept: PHARMACY | Age: 81
End: 2021-02-01

## 2021-02-01 NOTE — TELEPHONE ENCOUNTER
Rescheduled for 2/4 due to weather.      CLINICAL PHARMACY CONSULT: MED RECONCILIATION/REVIEW ADDENDUM    For Pharmacy Admin Tracking Only    PHSO: No  Total # of Interventions Recommended: 0  Total Interventions Accepted: 0  Time Spent (min): 5    Vince RaeD

## 2021-02-04 ENCOUNTER — ANTI-COAG VISIT (OUTPATIENT)
Dept: PHARMACY | Age: 81
End: 2021-02-04
Payer: MEDICARE

## 2021-02-04 DIAGNOSIS — I48.20 CHRONIC ATRIAL FIBRILLATION (HCC): ICD-10-CM

## 2021-02-04 DIAGNOSIS — G45.9 TIA (TRANSIENT ISCHEMIC ATTACK): ICD-10-CM

## 2021-02-04 LAB
INTERNATIONAL NORMALIZATION RATIO, POC: 2.2
POC INR: 2.2 INDEX
PROTHROMBIN TIME, POC: 26.3 SECONDS (ref 10–14.3)

## 2021-02-04 PROCEDURE — 36416 COLLJ CAPILLARY BLOOD SPEC: CPT

## 2021-02-04 PROCEDURE — 99211 OFF/OP EST MAY X REQ PHY/QHP: CPT

## 2021-02-04 PROCEDURE — 85610 PROTHROMBIN TIME: CPT

## 2021-02-08 ENCOUNTER — PROCEDURE VISIT (OUTPATIENT)
Dept: CARDIOLOGY CLINIC | Age: 81
End: 2021-02-08
Payer: MEDICARE

## 2021-02-08 DIAGNOSIS — I48.21 PERMANENT ATRIAL FIBRILLATION (HCC): ICD-10-CM

## 2021-02-08 DIAGNOSIS — Z95.0 CARDIAC PACEMAKER IN SITU: Primary | ICD-10-CM

## 2021-02-08 PROCEDURE — 93296 REM INTERROG EVL PM/IDS: CPT | Performed by: INTERNAL MEDICINE

## 2021-02-08 PROCEDURE — 93294 REM INTERROG EVL PM/LDLS PM: CPT | Performed by: INTERNAL MEDICINE

## 2021-02-22 ENCOUNTER — TELEPHONE (OUTPATIENT)
Dept: PHARMACY | Age: 81
End: 2021-02-22

## 2021-02-22 NOTE — TELEPHONE ENCOUNTER
Patient left voicemail that her surgery date has changed. Called home- rang with no answer. Called cell and no answer with no voicemail. Left patient voicemail to call clinic to schedule if needs to change 3/8 appointment.    CLINICAL PHARMACY CONSULT: MED RECONCILIATION/REVIEW ADDENDUM    For Pharmacy Admin Tracking Only    PHSO: No  Total # of Interventions Recommended: 0  Total Interventions Accepted: 0  Time Spent (min): 5    Abril Epps PharmD

## 2021-02-24 ENCOUNTER — TELEPHONE (OUTPATIENT)
Dept: CARDIOLOGY CLINIC | Age: 81
End: 2021-02-24

## 2021-02-24 NOTE — TELEPHONE ENCOUNTER
Patient concerned with shortness of breath, states off and on for about 2 weeks but now constant without activity and worsens with activity, increased fatigue.  Patient also has concerned as she is scheduled 3/8 for bladder surgery

## 2021-02-25 NOTE — TELEPHONE ENCOUNTER
Spoke with patient, she had another appointment this afternoon. Would really like OV to help her assess what's going on and if she should proceed with surgery.  She is willing to come to [address] scheduled for noon

## 2021-02-26 ENCOUNTER — HOSPITAL ENCOUNTER (OUTPATIENT)
Age: 81
Discharge: HOME | End: 2021-02-26
Payer: MEDICARE

## 2021-02-26 ENCOUNTER — OFFICE VISIT (OUTPATIENT)
Dept: CARDIOLOGY CLINIC | Age: 81
End: 2021-02-26
Payer: MEDICARE

## 2021-02-26 ENCOUNTER — HOSPITAL ENCOUNTER (OUTPATIENT)
Dept: GENERAL RADIOLOGY | Age: 81
Discharge: HOME | End: 2021-02-26
Payer: MEDICARE

## 2021-02-26 VITALS
HEART RATE: 80 BPM | DIASTOLIC BLOOD PRESSURE: 72 MMHG | SYSTOLIC BLOOD PRESSURE: 136 MMHG | HEIGHT: 65 IN | BODY MASS INDEX: 16.33 KG/M2 | WEIGHT: 98 LBS

## 2021-02-26 DIAGNOSIS — I48.20 CHRONIC ATRIAL FIBRILLATION (HCC): Primary | ICD-10-CM

## 2021-02-26 DIAGNOSIS — I35.1 NONRHEUMATIC AORTIC VALVE INSUFFICIENCY: ICD-10-CM

## 2021-02-26 DIAGNOSIS — Z01.810 PREOP CARDIOVASCULAR EXAM: ICD-10-CM

## 2021-02-26 DIAGNOSIS — R06.02 SHORTNESS OF BREATH: ICD-10-CM

## 2021-02-26 LAB
ANION GAP SERPL CALCULATED.3IONS-SCNC: 10 MMOL/L (ref 4–16)
BASOPHILS ABSOLUTE: 0 K/CU MM
BASOPHILS RELATIVE PERCENT: 0.5 % (ref 0–1)
BUN BLDV-MCNC: 14 MG/DL (ref 6–23)
CALCIUM SERPL-MCNC: 9.8 MG/DL (ref 8.3–10.6)
CHLORIDE BLD-SCNC: 102 MMOL/L (ref 99–110)
CO2: 31 MMOL/L (ref 21–32)
CREAT SERPL-MCNC: 1 MG/DL (ref 0.6–1.1)
DIFFERENTIAL TYPE: ABNORMAL
EOSINOPHILS ABSOLUTE: 0.3 K/CU MM
EOSINOPHILS RELATIVE PERCENT: 3.8 % (ref 0–3)
GFR AFRICAN AMERICAN: >60 ML/MIN/1.73M2
GFR NON-AFRICAN AMERICAN: 53 ML/MIN/1.73M2
GLUCOSE FASTING: 96 MG/DL (ref 70–99)
HCT VFR BLD CALC: 47.4 % (ref 37–47)
HEMOGLOBIN: 15.2 GM/DL (ref 12.5–16)
IMMATURE NEUTROPHIL %: 0.6 % (ref 0–0.43)
LYMPHOCYTES ABSOLUTE: 1.6 K/CU MM
LYMPHOCYTES RELATIVE PERCENT: 18.2 % (ref 24–44)
MCH RBC QN AUTO: 30.1 PG (ref 27–31)
MCHC RBC AUTO-ENTMCNC: 32.1 % (ref 32–36)
MCV RBC AUTO: 93.9 FL (ref 78–100)
MONOCYTES ABSOLUTE: 0.7 K/CU MM
MONOCYTES RELATIVE PERCENT: 7.7 % (ref 0–4)
PDW BLD-RTO: 14.4 % (ref 11.7–14.9)
PLATELET # BLD: 240 K/CU MM (ref 140–440)
PMV BLD AUTO: 10.3 FL (ref 7.5–11.1)
POTASSIUM SERPL-SCNC: 3.9 MMOL/L (ref 3.5–5.1)
PRO-BNP: 2347 PG/ML
RBC # BLD: 5.05 M/CU MM (ref 4.2–5.4)
SEGMENTED NEUTROPHILS ABSOLUTE COUNT: 6.1 K/CU MM
SEGMENTED NEUTROPHILS RELATIVE PERCENT: 69.2 % (ref 36–66)
SODIUM BLD-SCNC: 143 MMOL/L (ref 135–145)
TOTAL IMMATURE NEUTOROPHIL: 0.05 K/CU MM
WBC # BLD: 8.7 K/CU MM (ref 4–10.5)

## 2021-02-26 PROCEDURE — G8419 CALC BMI OUT NRM PARAM NOF/U: HCPCS | Performed by: NURSE PRACTITIONER

## 2021-02-26 PROCEDURE — 80048 BASIC METABOLIC PNL TOTAL CA: CPT

## 2021-02-26 PROCEDURE — 99204 OFFICE O/P NEW MOD 45 MIN: CPT | Performed by: NURSE PRACTITIONER

## 2021-02-26 PROCEDURE — 71046 X-RAY EXAM CHEST 2 VIEWS: CPT

## 2021-02-26 PROCEDURE — 1090F PRES/ABSN URINE INCON ASSESS: CPT | Performed by: NURSE PRACTITIONER

## 2021-02-26 PROCEDURE — G8400 PT W/DXA NO RESULTS DOC: HCPCS | Performed by: NURSE PRACTITIONER

## 2021-02-26 PROCEDURE — G8484 FLU IMMUNIZE NO ADMIN: HCPCS | Performed by: NURSE PRACTITIONER

## 2021-02-26 PROCEDURE — 36415 COLL VENOUS BLD VENIPUNCTURE: CPT

## 2021-02-26 PROCEDURE — 83880 ASSAY OF NATRIURETIC PEPTIDE: CPT

## 2021-02-26 PROCEDURE — 1123F ACP DISCUSS/DSCN MKR DOCD: CPT | Performed by: NURSE PRACTITIONER

## 2021-02-26 PROCEDURE — 85025 COMPLETE CBC W/AUTO DIFF WBC: CPT

## 2021-02-26 PROCEDURE — 4040F PNEUMOC VAC/ADMIN/RCVD: CPT | Performed by: NURSE PRACTITIONER

## 2021-02-26 PROCEDURE — G8427 DOCREV CUR MEDS BY ELIG CLIN: HCPCS | Performed by: NURSE PRACTITIONER

## 2021-02-26 PROCEDURE — 4004F PT TOBACCO SCREEN RCVD TLK: CPT | Performed by: NURSE PRACTITIONER

## 2021-02-26 PROCEDURE — 93000 ELECTROCARDIOGRAM COMPLETE: CPT | Performed by: INTERNAL MEDICINE

## 2021-02-26 RX ORDER — FUROSEMIDE 20 MG/1
20 TABLET ORAL DAILY
Qty: 90 TABLET | Refills: 1 | Status: SHIPPED | OUTPATIENT
Start: 2021-02-26 | End: 2021-05-04 | Stop reason: DRUGHIGH

## 2021-02-26 ASSESSMENT — ENCOUNTER SYMPTOMS
COUGH: 1
SHORTNESS OF BREATH: 1
ABDOMINAL DISTENTION: 1

## 2021-02-26 NOTE — PATIENT INSTRUCTIONS
1 tab lasix today  1 tab lasix tomorrow morning and with lunch  1 tab lasix Sunday morning and with lunch  1 tab lasix Monday morning till ov

## 2021-02-27 ENCOUNTER — TELEPHONE (OUTPATIENT)
Dept: CARDIOLOGY | Age: 81
End: 2021-02-27

## 2021-02-27 NOTE — TELEPHONE ENCOUNTER
Patient called results of labs and chest xray. Patient states understanding. Continue current plan of care.

## 2021-02-27 NOTE — ASSESSMENT & PLAN NOTE
Continues to have atrial fibrillation, Rate is well controlled  Continue coumadin for [facility]   Continue digoxin, and metoprolol for rate control

## 2021-02-27 NOTE — ASSESSMENT & PLAN NOTE
· Patient is experiencing symptoms currently  · Low salt diet advised  · Last Echo 10/2020 Summary   Left ventricular systolic function is normal.   Ejection fraction is visually estimated at 50-55%. Moderately dilated right atrium. Severe aortic regurgitation is noted. Vena contracta measures 0.49 CM   The regurgitant jet occupies 50 % of LVOT   Sclerotic, but non-stenotic aortic valve. Moderate to severe tricuspid regurgitation is present. Tricuspid valve prolapse is present. Mild prolapse of the anterior mitral valve leaflet. There was no thrombus noted in the left atrial appendage. Negative bubble study. No PFO or ASD noted. · Start lasix 20 mg BID for 2 days then daily. · Check echo next week. · Recommend surgery to be done at a hospital with cardiovascular assistance if needed, since she will require general anesthesia.

## 2021-02-27 NOTE — PROGRESS NOTES
Roxana Scott  [facility], [address] [facility],[address]  Phone: [phone]    Fax [phone]    Eduard Matias MD, Zack Atwood MD, Royal Sanam MD, MD Nancy Maza MD Janece Marseille, MD Shirlyn Riggs, MD Julio Kelly, SHAYAN Sahu, SHAYAN Padron, SHAYAN Thomason, APRFRED    CARDIOLOGY  NOTE    2021    River Dumas (:  1940) is a 79 y.o. female,Established patient with Dr. Ethel Dixon, here for evaluation of the following chief complaint(s):  Shortness of Breath (OV for SOB. Pt has chest pain,SOB for 1 month. Pt denies any dizziness,swelling to ankles,some palpitations.)        SUBJECTIVE/OBJECTIVE:    Patient is here to follow up on her shortness of breath. She states that she is having more shortness of breath over the last month and especially over the last 3-4 days. She is noticing some swelling. She has frothy sputum in the morning, and is not sleeping well due to coughing. She is very tired and has little appetite. She was planned to have bladder surgery on 3/8/2021 but she is worried her VHD is making her breathing worse and she is afraid to have the surgery done. Echo 10/2020 showed significant VHD with EF 55%. She denies issues obtaining her medications or taking them as prescribed. She denies chest pain, dizziness, or syncope      Review of Systems   Constitutional: Positive for fatigue. Negative for fever. Respiratory: Positive for cough and shortness of breath. Cardiovascular: Positive for leg swelling. Negative for chest pain and palpitations. Gastrointestinal: Positive for abdominal distention. Musculoskeletal: Negative for arthralgias and gait problem. Neurological: Negative for dizziness, syncope, weakness, light-headedness and headaches. Psychiatric/Behavioral: Positive for sleep disturbance.        Vitals:    21 1203   BP: 136/72   Pulse: 80   Weight: 98 lb (44.5 kg)   Height: 5' 5\" (1.651 m)       Wt Readings from Last 3 Encounters:   02/26/21 98 lb (44.5 kg)   10/27/20 106 lb 6.4 oz (48.3 kg)   09/29/20 105 lb 6.4 oz (47.8 kg)       BP Readings from Last 3 Encounters:   02/26/21 136/72   10/27/20 (!) 180/90   10/15/20 (!) 159/59       All medications reviewed and confirmed with patient. Physical Exam  Vitals signs reviewed. Constitutional:       General: She is not in acute distress. Appearance: Normal appearance. She is not ill-appearing. HENT:      Head: Atraumatic. Neck:      Musculoskeletal: Neck supple. No muscular tenderness. Vascular: JVD (mild) present. No carotid bruit. Cardiovascular:      Rate and Rhythm: Normal rate and regular rhythm. Pulses: Normal pulses. Heart sounds: Normal heart sounds. No murmur. Pulmonary:      Effort: Pulmonary effort is normal. No respiratory distress. Breath sounds: Rales (LLL) present. Musculoskeletal:         General: No deformity. Right lower leg: Edema (trace) present. Left lower leg: Edema (trace) present. Neurological:      Mental Status: She is alert. Health Maintenance   Topic Date Due    Hepatitis C screen  1940    COVID-19 Vaccine (1 of 2) 12/18/1957    DTaP/Tdap/Td vaccine (1 - Tdap) 12/18/1960    Shingles Vaccine (1 of 2) 12/18/1991    DEXA (modify frequency per FRAX score)  12/18/1996    Pneumococcal 65+ years Vaccine (1 of 1 - PPSV23) 12/18/2006    Annual Wellness Visit (AWV)  06/23/2019    Flu vaccine (1) 09/01/2020    Lipid screen  05/27/2021    Potassium monitoring  02/26/2022    Creatinine monitoring  02/26/2022    Hepatitis A vaccine  Aged Out    Hepatitis B vaccine  Aged Out    Hib vaccine  Aged Out    Meningococcal (ACWY) vaccine  Aged Out         ASSESSMENT/PLAN:    1.  Chronic atrial fibrillation (HCC)  Assessment & Plan:  Continues to have atrial fibrillation, Rate is well controlled  Continue coumadin for [facility]   Continue digoxin,

## 2021-03-06 ENCOUNTER — PROCEDURE VISIT (OUTPATIENT)
Dept: CARDIOLOGY CLINIC | Age: 81
End: 2021-03-06
Payer: MEDICARE

## 2021-03-06 DIAGNOSIS — Z01.810 PREOP CARDIOVASCULAR EXAM: ICD-10-CM

## 2021-03-06 DIAGNOSIS — I48.0 PAF (PAROXYSMAL ATRIAL FIBRILLATION) (HCC): ICD-10-CM

## 2021-03-06 DIAGNOSIS — Z95.0 CARDIAC PACEMAKER IN SITU: ICD-10-CM

## 2021-03-06 DIAGNOSIS — I35.1 NONRHEUMATIC AORTIC VALVE INSUFFICIENCY: ICD-10-CM

## 2021-03-06 DIAGNOSIS — R06.02 SHORTNESS OF BREATH: ICD-10-CM

## 2021-03-06 DIAGNOSIS — I63.9 CEREBRAL INFARCTION, UNSPECIFIED MECHANISM (HCC): ICD-10-CM

## 2021-03-06 DIAGNOSIS — I36.1 NONRHEUMATIC TRICUSPID VALVE REGURGITATION: ICD-10-CM

## 2021-03-06 DIAGNOSIS — I48.20 CHRONIC ATRIAL FIBRILLATION (HCC): ICD-10-CM

## 2021-03-06 LAB
LV EF: 53 %
LVEF MODALITY: NORMAL

## 2021-03-06 PROCEDURE — 93306 TTE W/DOPPLER COMPLETE: CPT | Performed by: INTERNAL MEDICINE

## 2021-03-08 ENCOUNTER — OFFICE VISIT (OUTPATIENT)
Dept: CARDIOLOGY CLINIC | Age: 81
End: 2021-03-08
Payer: MEDICARE

## 2021-03-08 ENCOUNTER — ANTI-COAG VISIT (OUTPATIENT)
Dept: PHARMACY | Age: 81
End: 2021-03-08
Payer: MEDICARE

## 2021-03-08 ENCOUNTER — HOSPITAL ENCOUNTER (OUTPATIENT)
Age: 81
Discharge: HOME | End: 2021-03-08
Payer: MEDICARE

## 2021-03-08 VITALS
DIASTOLIC BLOOD PRESSURE: 66 MMHG | HEART RATE: 72 BPM | SYSTOLIC BLOOD PRESSURE: 122 MMHG | WEIGHT: 101 LBS | BODY MASS INDEX: 16.83 KG/M2 | HEIGHT: 65 IN

## 2021-03-08 VITALS — TEMPERATURE: 97.2 F

## 2021-03-08 DIAGNOSIS — I50.33 ACUTE ON CHRONIC HEART FAILURE WITH PRESERVED EJECTION FRACTION (HCC): Primary | ICD-10-CM

## 2021-03-08 DIAGNOSIS — G45.9 TIA (TRANSIENT ISCHEMIC ATTACK): ICD-10-CM

## 2021-03-08 DIAGNOSIS — I48.20 CHRONIC ATRIAL FIBRILLATION (HCC): ICD-10-CM

## 2021-03-08 LAB
ANION GAP SERPL CALCULATED.3IONS-SCNC: 10 MMOL/L (ref 4–16)
BUN BLDV-MCNC: 16 MG/DL (ref 6–23)
CALCIUM SERPL-MCNC: 9.1 MG/DL (ref 8.3–10.6)
CHLORIDE BLD-SCNC: 101 MMOL/L (ref 99–110)
CO2: 29 MMOL/L (ref 21–32)
CREAT SERPL-MCNC: 0.9 MG/DL (ref 0.6–1.1)
GFR AFRICAN AMERICAN: >60 ML/MIN/1.73M2
GFR NON-AFRICAN AMERICAN: >60 ML/MIN/1.73M2
GLUCOSE BLD-MCNC: 82 MG/DL (ref 70–99)
INTERNATIONAL NORMALIZATION RATIO, POC: 1.7
POC INR: 1.7 INDEX
POTASSIUM SERPL-SCNC: 4 MMOL/L (ref 3.5–5.1)
PRO-BNP: 766.8 PG/ML
PROTHROMBIN TIME, POC: 20.4 SECONDS (ref 10–14.3)
SODIUM BLD-SCNC: 140 MMOL/L (ref 135–145)

## 2021-03-08 PROCEDURE — 80048 BASIC METABOLIC PNL TOTAL CA: CPT

## 2021-03-08 PROCEDURE — G8419 CALC BMI OUT NRM PARAM NOF/U: HCPCS | Performed by: NURSE PRACTITIONER

## 2021-03-08 PROCEDURE — 1090F PRES/ABSN URINE INCON ASSESS: CPT | Performed by: NURSE PRACTITIONER

## 2021-03-08 PROCEDURE — G8484 FLU IMMUNIZE NO ADMIN: HCPCS | Performed by: NURSE PRACTITIONER

## 2021-03-08 PROCEDURE — 83880 ASSAY OF NATRIURETIC PEPTIDE: CPT

## 2021-03-08 PROCEDURE — G8427 DOCREV CUR MEDS BY ELIG CLIN: HCPCS | Performed by: NURSE PRACTITIONER

## 2021-03-08 PROCEDURE — 36415 COLL VENOUS BLD VENIPUNCTURE: CPT

## 2021-03-08 PROCEDURE — G8400 PT W/DXA NO RESULTS DOC: HCPCS | Performed by: NURSE PRACTITIONER

## 2021-03-08 PROCEDURE — 1123F ACP DISCUSS/DSCN MKR DOCD: CPT | Performed by: NURSE PRACTITIONER

## 2021-03-08 PROCEDURE — 4040F PNEUMOC VAC/ADMIN/RCVD: CPT | Performed by: NURSE PRACTITIONER

## 2021-03-08 PROCEDURE — 99214 OFFICE O/P EST MOD 30 MIN: CPT | Performed by: NURSE PRACTITIONER

## 2021-03-08 PROCEDURE — 85610 PROTHROMBIN TIME: CPT

## 2021-03-08 PROCEDURE — 4004F PT TOBACCO SCREEN RCVD TLK: CPT | Performed by: NURSE PRACTITIONER

## 2021-03-08 RX ORDER — WARFARIN SODIUM 5 MG/1
5 TABLET ORAL DAILY
COMMUNITY
End: 2021-07-03

## 2021-03-08 NOTE — PROGRESS NOTES
Medication Management Service  [facility]  [phone]    Visit Date: 3/8/2021   Subjective:       Doreen Kanner is a 79 y.o. female who presents to clinic today for anticoagulation monitoring and adjustment. Patient seen in clinic for warfarin management due to  Indication:   atrial fibrillation. INR goal: of 2.0-3.0. Duration of therapy: indefinite. Patient reports the following:   Adherent with regimen  Missed or extra doses:  None   Bleeding or thromboembolic side effects:  None  Significant medication changes:  None  Significant dietary changes: None  Significant alcohol or tobacco changes: None  Significant recent illness, disease state changes, or hospitalization:  SOB  Upcoming surgeries or procedures:  None  Falls: None           Assessment and Plan     PT/INR done in office per protocol. INR today is 1.7, subtherapeutic, likely due to held doses for 2 days before bladder surgery was cancelled. Plan:  Take wararin 5mg x1 then will continue current regimen of warfarin 5mg daily except 25mg on Tuesdays. Recheck INR in 4 week(s), but patient will call once more information known on surgery date. Patient verbalized understanding of dosing directions and information discussed. Dosing schedule given to patient including phone number, appointment date, and time. Progress note sent to referring office. Patient acknowledges working in consult agreement with pharmacist as referred by his/her physician.       Electronically signed by Cory Copeland, [facility] on 3/8/21 at 11:01 AM EST    CLINICAL PHARMACY CONSULT: MED RECONCILIATION/REVIEW [facility]  22. Tracking Only    PHSO: No  Total # of Interventions Recommended: 1  - Increased Dose #: 1  - Maintenance Safety Lab Monitoring #: 1  Total Interventions Accepted: 1  Time Spent (min): 15    Cory Copeland, VinceD

## 2021-03-08 NOTE — PROGRESS NOTES
[facility] [address] [facility],Third [address]  Phone: [phone]    Fax [phone]    Toi Medel MD, Wilfrido Mustafa MD, [address] Urmila Rowe MD, MD Zaki Hays MD Zoila Fresh, MD Lesley Lovely, MD Suzan He, APRN      Bety Barnes, APRFRED Fitch, [facility], APRN    CARDIOLOGY  NOTE    3/8/2021    William Fleming (:  1940) is a 79 y.o. female,Established patient with Dr. Sam Jimenez, here for evaluation of the following chief complaint(s):  Results (here to review echo done 3/6/21. pt. suppose to have tumor removed from bladder by Dr. Chris Valdez,, no date yet.) and Edema (ankle swelling.)        SUBJECTIVE/OBJECTIVE:    Patient is here to follow up on her shortness of breath. She states that she is having more shortness of breath over the last month and especially over the last 3-4 days. She is noticing some swelling. She has frothy sputum in the morning, and is not sleeping well due to coughing. She is very tired and has little appetite. She was planned to have bladder surgery on 3/8/2021 but she is worried her VHD is making her breathing worse and she is afraid to have the surgery done. Echo 10/2020 showed significant VHD with EF 55%. She denies issues obtaining her medications or taking them as prescribed. She denies chest pain, dizziness, or syncope      Review of Systems   Constitutional: Positive for fatigue. Negative for fever. Respiratory: Positive for cough and shortness of breath. Cardiovascular: Positive for leg swelling. Negative for chest pain and palpitations. Gastrointestinal: Positive for abdominal distention. Musculoskeletal: Negative for arthralgias and gait problem. Neurological: Negative for dizziness, syncope, weakness, light-headedness and headaches. Psychiatric/Behavioral: Positive for sleep disturbance.        Vitals:    21 1604   BP: 122/66   Pulse: 72 Weight: 101 lb (45.8 kg)   Height: 5' 5\" (1.651 m)       Wt Readings from Last 3 Encounters:   03/08/21 101 lb (45.8 kg)   02/26/21 98 lb (44.5 kg)   10/27/20 106 lb 6.4 oz (48.3 kg)       BP Readings from Last 3 Encounters:   03/08/21 122/66   02/26/21 136/72   10/27/20 (!) 180/90       All medications reviewed and confirmed with patient. Physical Exam  Vitals signs reviewed. Constitutional:       General: She is not in acute distress. Appearance: Normal appearance. She is not ill-appearing. HENT:      Head: Atraumatic. Neck:      Musculoskeletal: Neck supple. No muscular tenderness. Vascular: JVD (mild) present. No carotid bruit. Cardiovascular:      Rate and Rhythm: Normal rate and regular rhythm. Pulses: Normal pulses. Heart sounds: Normal heart sounds. No murmur. Pulmonary:      Effort: Pulmonary effort is normal. No respiratory distress. Breath sounds: Rales (LLL) present. Musculoskeletal:         General: No deformity. Right lower leg: Edema (trace) present. Left lower leg: Edema (trace) present. Neurological:      Mental Status: She is alert. Health Maintenance   Topic Date Due    Hepatitis C screen  Never done    COVID-19 Vaccine (1 of 2) Never done    DTaP/Tdap/Td vaccine (1 - Tdap) Never done    Shingles Vaccine (1 of 2) Never done    DEXA (modify frequency per FRAX score)  Never done    Pneumococcal 65+ years Vaccine (1 of 1 - PPSV23) Never done   [employer] Visit (AWV)  Never done    Flu vaccine (1) Never done    Lipid screen  05/27/2021    Potassium monitoring  02/26/2022    Creatinine monitoring  02/26/2022    Hepatitis A vaccine  Aged Out    Hepatitis B vaccine  Aged Out    Hib vaccine  Aged Out    Meningococcal (ACWY) vaccine  Aged Out         ASSESSMENT/PLAN:    1.  Chronic atrial fibrillation (HCC)  Assessment & Plan:  Continues to have atrial fibrillation, Rate is well controlled  Continue coumadin for [facility]   Continue digoxin, and metoprolol for rate control  2. Nonrheumatic aortic valve insufficiency  Assessment & Plan:  · Patient is experiencing symptoms currently  · Low salt diet advised  · Last Echo 10/2020 Summary   Left ventricular systolic function is normal.   Ejection fraction is visually estimated at 50-55%. Moderately dilated right atrium. Severe aortic regurgitation is noted. Vena contracta measures 0.49 CM   The regurgitant jet occupies 50 % of LVOT   Sclerotic, but non-stenotic aortic valve. Moderate to severe tricuspid regurgitation is present. Tricuspid valve prolapse is present. Mild prolapse of the anterior mitral valve leaflet. There was no thrombus noted in the left atrial appendage. Negative bubble study. No PFO or ASD noted. · Patient is tolerating Lasix well. We will recheck BMP and BNP for further evaluation of fluid overload. Orders:   Future  -     Basic Metabolic Panel; Future  -     BRAIN NATRIURETIC PEPTIDE (BNP); Future  3. Shortness of breath  Improved. She is able to do more activity than what she had at last office visit. Patient states that she has not had any additional orthopnea. 4.  Heart failure preserved ejection fraction  · Patient feels much better with addition of Lasix. We will continue to monitor closely. · Heart failure likely precipitated by valvular heart disease and patient increase in fluid intake due to attempting to prevent UTI. · Discussed again limiting fluid intake to 64 ounces a day and sodium restriction of 2 g/day. · Patient daughter and patient state understanding.     5. Preop cardiovascular exam   Revised Cardiac Risk Index:   High risk type of surgery no   H/O ischemic heart disease  (h/o MI, or a positive stress test, current complaint of chest pain considered to be secondary to myocardial ischemia,  Use of nitrate therapy or ECG with pathological Q waves; do not count prior coronary revascularization procedure unless one of the other criteria for ischemic heart disease is present) no     H/O heart failure yes  H/O CVA no   Patient is  able to walk up a flight of stairs or walk around the block  H/O DM treated with insulin no  Preoperative serum creatinine >2.0 mg/dl (177 micromol/L) no   Has a history of atrial fibrillation Yes  Has a history of VHD Yes  EKG ( 2/26/2021) does not have changes from EKG ( 1/30/2020)    The calculated rate of cardiac death, nonfatal myocardial infarction, and nonfatal cardiac arrest according to the number of predictors is; Three or more risk factors  5.4% (95% CI: 2.8-7.9)     she is considered a moderate risk for surgery. Anticoagulation or Antiplatelet therapy can be held prior to surgery at the discretion of the surgeon. It should be resumed as soon as possible if held. Return for With Physician aproximately 1-2 weeks post surgery, or sooner if needed. An electronic signature was used to authenticate this note.     Electronically signed by SHAYAN Eli CNP on 3/8/2021 at 5:00 PM

## 2021-03-08 NOTE — LETTER
Serge Mohamud President  1940  [id number]    Have you had any Chest Pain that is not new? - No    Have you had any Shortness of Breath - No    Have you had any dizziness - No    Have you had any palpitations that are not new? - No    If Yes DO EKG - Needs done every 6 months    Do you have any edema - swelling in ankles  Sometimes at night, not always  If Yes - CHECK TO SEE IF THE EDEMA IS PITTING  How long have they been having edema - Years  If Yes - Have they worn compression stockings No    Vein \"LEG PROBLEM Questionnaire\"  1. Do you have prominent leg veins? Yes   2. Do you have any skin discoloration? Yes  3. Do you have any healed or active sores? Yes  4. Do you have swelling of the legs? No  5. Do you have a family history of varicose veins? No  6. Does your profession involve pro-longed        standing or heavy lifting? No  7. Have you been fighting overweight problems? No  8. Do you have restless legs? No  9. Do you have any night time cramps? No  10. Do you have any of the following in your legs:        Aching and Itching     Do you have a surgery or procedure scheduled in the near future - Yes, unsure when, cancelled it because she was having SOB  If Yes- DO EKG  If Yes - Who is the surgery with?  Dr. Millard Drafts    Type of surgery : remove tumor from bladder

## 2021-03-08 NOTE — Clinical Note
Moderate risk. Request done at hospital with cardiology support due to VHD, advanced age, and recent CHF exacerbation.

## 2021-03-12 ENCOUNTER — TELEPHONE (OUTPATIENT)
Dept: CARDIOLOGY CLINIC | Age: 81
End: 2021-03-12

## 2021-03-12 NOTE — TELEPHONE ENCOUNTER
----- Message from SHAYAN Baxter CNP sent at 3/12/2021  6:04 AM EST -----  Regarding: labs  Labs are significantly improved.    Please ask patient to continue current plan and keep OV in April    Thanks  Tiff  ----- Message -----  From: [facility] Incoming Lab Results From [place] (Robinson Rosenbaum)  Sent: 3/8/2021   6:42 PM EST  To: SHAYAN Baxter CNP

## 2021-03-17 ENCOUNTER — TELEPHONE (OUTPATIENT)
Dept: CARDIOLOGY CLINIC | Age: 81
End: 2021-03-17

## 2021-03-17 NOTE — TELEPHONE ENCOUNTER
Patient notified about labs being improved and continue current plan she was also reminded of her appointment on 4/20/2021 @ 2 pm she verbalized understanding----- Message from SHAYAN Jean CNP sent at 3/12/2021  6:04 AM EST -----  Regarding: labs  Labs are significantly improved.    Please ask patient to continue current plan and keep OV in April    Thanks  Tiff  ----- Message -----  From: [facility] Incoming Lab Results From [place] (Ben Cruz)  Sent: 3/8/2021   6:42 PM EST  To: SHAYAN Jean CNP

## 2021-03-19 ENCOUNTER — TELEPHONE (OUTPATIENT)
Dept: CARDIOLOGY CLINIC | Age: 81
End: 2021-03-19

## 2021-03-19 NOTE — TELEPHONE ENCOUNTER
CHIQUITAI... Patient has bladder cancer. She was delayed surgery due to being in acute HF last month. We started lasix and discussed fluid management. She was feeling much better when I saw her 3/8/2021. BNP was 2347 2/26/2021 then 766 3/8. I did discuss with her and her daughter the need for her to have procedure done in hospital due to recent Acute HF and risk of going back into HF. They were going to discuss with urology and see when the procedure could be done.

## 2021-03-19 NOTE — TELEPHONE ENCOUNTER
Have her see me in office next week'  She may be high risk for surgery if she is still in heart failure? She was in heart failure when she seen by sharri last week  How urgent or important is this surgery?   If surgery is needed we can try and tune her up and get through it   Let me see her and talk to her

## 2021-03-19 NOTE — TELEPHONE ENCOUNTER
Cardiologist: Dr. Sandra Corona  Surgeon: Dr. Diya Bliss  Surgery: cystoscopy,  TURP  Anesthesia: General  Date: ASAP  FAX# [phone]  # [phone]    Last OV 3/8/2021 w/Milena      1. Chronic atrial fibrillation (HCC)  Assessment & Plan:  Continues to have atrial fibrillation, Rate is well controlled  [facility]   Continue digoxin, and metoprolol for rate control  2. Nonrheumatic aortic valve insufficiency    3. Shortness of breath  Improved. She is able to do more activity than what she had at last office visit. Patient states that she has not had any additional orthopnea.     4.  Heart failure preserved ejection fraction  · Patient feels much better with addition of Lasix. We will continue to monitor closely. · Heart failure likely precipitated by valvular heart disease and patient increase in fluid intake due to attempting to prevent UTI. · Discussed again limiting fluid intake to 64 ounces a day and sodium restriction of 2 g/day. · Patient daughter and patient state understanding.     5. Preop cardiovascular exam        Revised Cardiac Risk Index:   High risk type of surgery no   H/O ischemic heart disease  (h/o MI, or a positive stress test, current complaint of chest pain considered to be secondary to myocardial ischemia,  Use of nitrate therapy or ECG with pathological Q waves; do not count prior coronary revascularization procedure unless one of the other criteria for ischemic heart disease is present) no     H/O heart failure yes  H/O CVA no   Patient is  able to walk up a flight of stairs or walk around the block  H/O DM treated with insulin no  Preoperative serum creatinine >2.0 mg/dl (177 micromol/L) no   Has a history of atrial fibrillation Yes  Has a history of VHD Yes  EKG ( 2/26/2021) does not have changes from EKG ( 1/30/2020)     The calculated rate of cardiac death, nonfatal myocardial infarction, and nonfatal cardiac arrest according to the number of predictors is;    Three or

## 2021-03-19 NOTE — TELEPHONE ENCOUNTER
She is moderate  To high risk for surgery but can proceed with surgery   She will need close cardiac monitoring perioperatively

## 2021-03-23 RX ORDER — NIFEDIPINE 30 MG/1
TABLET, EXTENDED RELEASE ORAL
Qty: 30 TABLET | Refills: 2 | Status: SHIPPED | OUTPATIENT
Start: 2021-03-23 | End: 2021-05-04 | Stop reason: SDUPTHER

## 2021-04-05 ENCOUNTER — ANTI-COAG VISIT (OUTPATIENT)
Dept: PHARMACY | Age: 81
End: 2021-04-05
Payer: MEDICARE

## 2021-04-05 DIAGNOSIS — I48.20 CHRONIC ATRIAL FIBRILLATION (HCC): ICD-10-CM

## 2021-04-05 DIAGNOSIS — G45.9 TIA (TRANSIENT ISCHEMIC ATTACK): ICD-10-CM

## 2021-04-05 LAB
INTERNATIONAL NORMALIZATION RATIO, POC: 2.2
POC INR: 2.2 INDEX
PROTHROMBIN TIME, POC: 27 SECONDS (ref 10–14.3)

## 2021-04-05 PROCEDURE — 36416 COLLJ CAPILLARY BLOOD SPEC: CPT

## 2021-04-05 PROCEDURE — 85610 PROTHROMBIN TIME: CPT

## 2021-04-05 PROCEDURE — 99211 OFF/OP EST MAY X REQ PHY/QHP: CPT

## 2021-04-15 ENCOUNTER — HOSPITAL ENCOUNTER (OUTPATIENT)
Dept: PREADMISSION TESTING | Age: 81
Discharge: HOME | End: 2021-04-19
Payer: MEDICARE

## 2021-04-15 ENCOUNTER — HOSPITAL ENCOUNTER (OUTPATIENT)
Dept: LAB | Age: 81
Discharge: HOME | End: 2021-04-15
Payer: MEDICARE

## 2021-04-15 VITALS
WEIGHT: 98 LBS | HEART RATE: 82 BPM | HEIGHT: 65 IN | BODY MASS INDEX: 16.33 KG/M2 | DIASTOLIC BLOOD PRESSURE: 65 MMHG | OXYGEN SATURATION: 98 % | RESPIRATION RATE: 16 BRPM | TEMPERATURE: 96.2 F | SYSTOLIC BLOOD PRESSURE: 123 MMHG

## 2021-04-15 PROCEDURE — U0005 INFEC AGEN DETEC AMPLI PROBE: HCPCS

## 2021-04-15 PROCEDURE — U0003 INFECTIOUS AGENT DETECTION BY NUCLEIC ACID (DNA OR RNA); SEVERE ACUTE RESPIRATORY SYNDROME CORONAVIRUS 2 (SARS-COV-2) (CORONAVIRUS DISEASE [COVID-19]), AMPLIFIED PROBE TECHNIQUE, MAKING USE OF HIGH THROUGHPUT TECHNOLOGIES AS DESCRIBED BY CMS-2020-01-R: HCPCS

## 2021-04-15 PROCEDURE — 36415 COLL VENOUS BLD VENIPUNCTURE: CPT

## 2021-04-15 RX ORDER — AMOXICILLIN AND CLAVULANATE POTASSIUM 500; 125 MG/1; MG/1
1 TABLET, FILM COATED ORAL 3 TIMES DAILY
COMMUNITY
End: 2021-05-04

## 2021-04-15 RX ORDER — CIPROFLOXACIN 2 MG/ML
400 INJECTION, SOLUTION INTRAVENOUS ONCE
Status: CANCELLED | OUTPATIENT
Start: 2021-04-19

## 2021-04-16 ENCOUNTER — ANESTHESIA EVENT (OUTPATIENT)
Dept: OPERATING ROOM | Age: 81
End: 2021-04-16
Payer: MEDICARE

## 2021-04-16 LAB
SARS-COV-2: NOT DETECTED
SOURCE: NORMAL

## 2021-04-16 ASSESSMENT — LIFESTYLE VARIABLES: SMOKING_STATUS: 1

## 2021-04-16 NOTE — PROGRESS NOTES
4/16/21 -LM on daughters phone- surgery 4/19/21 @ 0930, arrival 0730. Please call with any questions.

## 2021-04-16 NOTE — ANESTHESIA PRE PROCEDURE
Department of Anesthesiology  Preprocedure Note       Name:  Sera Hudson   Age:  79 y.o.  :  1940                                          MRN:  [id number]         Date:  2021      Surgeon: Emily Sherwood):  Darell Pascual MD    Procedure: Procedure(s):  CYSTOSCOPY TRANSURETHRAL RESECTION BLADDER    Medications prior to admission:   Prior to Admission medications    Medication Sig Start Date End Date Taking? Authorizing Provider   amoxicillin-clavulanate (AUGMENTIN) 500-125 MG per tablet Take 1 tablet by mouth 3 times daily    Historical Provider, MD   NIFEdipine (PROCARDIA XL) 30 MG extended release tablet TAKE ONE TABLET BY MOUTH DAILY 3/23/21   Latricia Mckeon MD   warfarin (COUMADIN) 5 MG tablet Take 5 mg by mouth daily Except 2.5mg on     Historical Provider, MD   furosemide (LASIX) 20 MG tablet Take 1 tablet by mouth daily 21   Nereyda Check, APRN - CNP   pravastatin (PRAVACHOL) 10 MG tablet Take 1 tablet by mouth daily 20   Latricia Mckeon MD   metoprolol tartrate (LOPRESSOR) 50 MG tablet Take 1 tablet by mouth 2 times daily 20   Nereyda Check, APRN - CNP   digoxin (LANOXIN) 125 MCG tablet Take 1 tablet by mouth daily 20   Latricia Mckeon MD   valsartan (DIOVAN) 320 MG tablet Take 320 mg by mouth daily    Historical Provider, MD       Current medications:    No current facility-administered medications for this encounter.       Current Outpatient Medications   Medication Sig Dispense Refill    amoxicillin-clavulanate (AUGMENTIN) 500-125 MG per tablet Take 1 tablet by mouth 3 times daily      NIFEdipine (PROCARDIA XL) 30 MG extended release tablet TAKE ONE TABLET BY MOUTH DAILY 30 tablet 2    warfarin (COUMADIN) 5 MG tablet Take 5 mg by mouth daily Except 2.5mg on       furosemide (LASIX) 20 MG tablet Take 1 tablet by mouth daily 90 tablet 1    pravastatin (PRAVACHOL) 10 MG tablet Take 1 tablet by mouth daily 90 tablet 3    metoprolol tartrate (LOPRESSOR) 50 MG tablet Take 1 tablet by mouth 2 times daily 180 tablet 3    digoxin (LANOXIN) 125 MCG tablet Take 1 tablet by mouth daily 90 tablet 2    valsartan (DIOVAN) 320 MG tablet Take 320 mg by mouth daily         Allergies: Allergies   Allergen Reactions    Simvastatin Other (See Comments)     \"every joint aches\"         Problem List:    Patient Active Problem List   Diagnosis Code    TIA (transient ischemic attack) G45.9    Cerebral infarction ([address] I63.9    Pacemaker Z95.0    Hyperlipidemia E78.5    Tobacco abuse Z72.0    Hypertension I10    VHD (valvular heart disease) I38    PAF (paroxysmal atrial fibrillation) ([facility]) I48.0    Cardiac pacemaker in situ Z95.0    Unspecified atrial fibrillation ([address] I48.91    Long term current use of anticoagulant therapy Z79.01    Statin intolerance Z78.9    Chronic atrial fibrillation ([facility]) I48.20    Bladder cancer ([address] C67.9    Nonrheumatic aortic valve insufficiency I35.1    Nonrheumatic tricuspid valve regurgitation I36.1       Past Medical History:        Diagnosis Date    Abnormal urine     \"surgry cancelled on 2/6/2020- had abnormal urine so put me on antibiotics and did another urine at office 2/11/2020- have not heard if ok yet\"    Arthritis     Left hand    Atrial fibrillation (HCC)     **[facility] follows PT/INRs,along w/prescribing Coumadin. ** - Dr. Scooby Villavicencio Bladder infection     \"started on antibiotic 4/14/2021 for this\"    Bladder tumor     \"for surgery 4/19/2021 for this\"    Cancer [facility])     per old chart hx bladder CA in 2012- , skin cancer removed left leg 2021    H/O echocardiogram 06/16/2020    EF 55-60%, Severe AR &TR, Mild-Mod PHTN.    H/O transesophageal echocardiography (VEENA) for monitoring 10/15/2020    Moderately dilated right atrium. EF 50-55%, Severe AR, Mod to severe TR.    [place] (hard of hearing)     emily  no hearing aides    Hx of echocardiogram 04/05/2017    EF50-55%,moderate aortic regurg,mild to moderate pulmonary HTN, moderate tricusid regurg    Hyperlipidemia     Hypertension     Follows with Dr Jocelyne Teresa (sick sinus syndrome) [facility]     TIA (transient ischemic attack) 06/24/2012    Came to ER for evaluation - no residual effects per pt 2/18/2020-\"I lost feeling in my left side- lasted a couple of hours\"    Wears glasses     to read       Past Surgical History:        Procedure Laterality Date   [place] 2016    [facility]  ?? Normal exam per pt - Dr. Cathleen Godinez 2/20/2020    CYSTOSCOPY TRANSURETHRAL RESECTION BLADDER performed by Hallie Ulrich MD at [facility] 3    collasped lung    PACEMAKER INSERTION  10/08/2008    [employer] - dr. Roseann Teixeira  (new battery) 2018?  TUBAL LIGATION  1974       Social History:    Social History     Tobacco Use    Smoking status: Current Every Day Smoker     Packs/day: 0.50     Years: 67.00     Pack years: 33.50     Types: Cigarettes     Start date: 1953    Smokeless tobacco: Never Used    Tobacco comment: 5 or 6 cigarettes a day ( per pt 4/15/2021   Substance Use Topics    Alcohol use: No                                Ready to quit: Not Answered  Counseling given: Not Answered  Comment: 5 or 6 cigarettes a day ( per pt 4/15/2021      Vital Signs (Current): There were no vitals filed for this visit.                                            BP Readings from Last 3 Encounters:   04/15/21 123/65   03/08/21 122/66   02/26/21 136/72       NPO Status:                                                                                 BMI:   Wt Readings from Last 3 Encounters:   04/15/21 98 lb (44.5 kg)   03/08/21 101 lb (45.8 kg)   02/26/21 98 lb (44.5 kg)     There is no height or weight on file to calculate BMI.    CBC:   Lab Results   Component Value Date    WBC 8.7 02/26/2021    RBC 5.05 02/26/2021    HGB 15.2 02/26/2021    HCT 47.4 02/26/2021    MCV 93.9 02/26/2021    RDW 14.4 02/26/2021     02/26/2021       CMP:   Lab Results   Component Value Date     03/08/2021    K 4.0 03/08/2021     03/08/2021    CO2 29 03/08/2021    BUN 16 03/08/2021    CREATININE 0.9 03/08/2021    GFRAA >60 03/08/2021    LABGLOM >60 03/08/2021    GLUCOSE 82 03/08/2021    PROT 6.2 05/27/2020    PROT 6.1 06/24/2012    CALCIUM 9.1 03/08/2021    BILITOT 1.4 05/27/2020    ALKPHOS 85 05/27/2020    AST 16 05/27/2020    ALT 10 05/27/2020       POC Tests: No results for input(s): POCGLU, POCNA, POCK, POCCL, POCBUN, POCHEMO, POCHCT in the last 72 hours. Coags:   Lab Results   Component Value Date    PROTIME 27.0 04/05/2021    INR 2.2 04/05/2021    INR 2.55 06/11/2020    APTT 34.3 08/22/2016       HCG (If Applicable): No results found for: PREGTESTUR, PREGSERUM, HCG, HCGQUANT     ABGs: No results found for: PHART, PO2ART, FKU4RNT, CYS1NVD, BEART, K1OFWHVY     Type & Screen (If Applicable):  No results found for: LABABO, LABRH    Drug/Infectious Status (If Applicable):  No results found for: HIV, HEPCAB    COVID-19 Screening (If Applicable):   Lab Results   Component Value Date    COVID19 NOT DETECTED 04/15/2021           Anesthesia Evaluation  Patient summary reviewed  Airway: Mallampati: I        Dental:          Pulmonary: breath sounds clear to auscultation  (+) pneumonia: resolved,  current smoker                          ROS comment: CXR 2/2021:  Impression  COPD.  Otherwise, no acute cardiopulmonary process. Cardiovascular:    (+) hypertension:, pacemaker: pacemaker, dysrhythmias: atrial fibrillation, pulmonary hypertension: severe, hyperlipidemia        Rhythm: irregular  Rate: normal           Beta Blocker:  Order written      ROS comment: Dr Lluvia Yañez clears for sx    Echo 3/2021:  Summary   Left ventricular systolic function is normal with an ejection fraction of   50-55%. Mild concentric left ventricular hypertrophy.    Mild right atrial dilation. Sclerotic, but non-stenotic aortic valve. Moderate to Severe aortic regurgitation. PHT is 285 MSec   Moderate tricuspid and mitral regurgitation. Pacer wire noted on right side passing through tricuspid valve. Severe pulmonary hypertension at 74 mmHg. No evidence of pericardial effusion. Dilated aortic root at 3.9 cm. Neuro/Psych:   (+) TIA,             GI/Hepatic/Renal: Neg GI/Hepatic/Renal ROS            Endo/Other:    (+) malignancy/cancer. Abdominal:           Vascular: negative vascular ROS. Anesthesia Plan      general     ASA 4       Induction: intravenous. MIPS: Postoperative opioids intended. Anesthetic plan and risks discussed with patient. Plan discussed with CRNA. Attending anesthesiologist reviewed and agrees with Pre Eval content            Yvette Homans, APRN - CRNA   4/16/2021       Pre Anesthesia Assessment complete.  Chart reviewed on 4/16/2021

## 2021-04-19 ENCOUNTER — HOSPITAL ENCOUNTER (OUTPATIENT)
Age: 81
Setting detail: OUTPATIENT SURGERY
Discharge: HOME | End: 2021-04-19
Attending: UROLOGY | Admitting: UROLOGY
Payer: MEDICARE

## 2021-04-19 ENCOUNTER — ANESTHESIA (OUTPATIENT)
Dept: OPERATING ROOM | Age: 81
End: 2021-04-19
Payer: MEDICARE

## 2021-04-19 ENCOUNTER — TELEPHONE (OUTPATIENT)
Dept: PHARMACY | Age: 81
End: 2021-04-19

## 2021-04-19 VITALS
DIASTOLIC BLOOD PRESSURE: 74 MMHG | WEIGHT: 98 LBS | SYSTOLIC BLOOD PRESSURE: 142 MMHG | HEART RATE: 72 BPM | OXYGEN SATURATION: 96 % | RESPIRATION RATE: 16 BRPM | TEMPERATURE: 96.6 F | BODY MASS INDEX: 16.33 KG/M2 | HEIGHT: 65 IN

## 2021-04-19 VITALS
OXYGEN SATURATION: 100 % | SYSTOLIC BLOOD PRESSURE: 108 MMHG | DIASTOLIC BLOOD PRESSURE: 56 MMHG | TEMPERATURE: 95.5 F | RESPIRATION RATE: 6 BRPM

## 2021-04-19 DIAGNOSIS — I48.20 CHRONIC ATRIAL FIBRILLATION (HCC): ICD-10-CM

## 2021-04-19 DIAGNOSIS — G45.9 TIA (TRANSIENT ISCHEMIC ATTACK): ICD-10-CM

## 2021-04-19 LAB
ABO/RH: NORMAL
ANTIBODY SCREEN: NEGATIVE
BASOPHILS ABSOLUTE: 0.1 K/CU MM
BASOPHILS RELATIVE PERCENT: 0.7 % (ref 0–1)
DIFFERENTIAL TYPE: ABNORMAL
EOSINOPHILS ABSOLUTE: 0.1 K/CU MM
EOSINOPHILS RELATIVE PERCENT: 1.9 % (ref 0–3)
HCT VFR BLD CALC: 45.4 % (ref 37–47)
HEMOGLOBIN: 14.7 GM/DL (ref 12.5–16)
IMMATURE NEUTROPHIL %: 0.4 % (ref 0–0.43)
INR BLD: 1.01 INDEX
LYMPHOCYTES ABSOLUTE: 2 K/CU MM
LYMPHOCYTES RELATIVE PERCENT: 29.5 % (ref 24–44)
MCH RBC QN AUTO: 30.4 PG (ref 27–31)
MCHC RBC AUTO-ENTMCNC: 32.4 % (ref 32–36)
MCV RBC AUTO: 94 FL (ref 78–100)
MONOCYTES ABSOLUTE: 0.6 K/CU MM
MONOCYTES RELATIVE PERCENT: 8.4 % (ref 0–4)
NUCLEATED RBC %: 0 %
PDW BLD-RTO: 14.4 % (ref 11.7–14.9)
PLATELET # BLD: 209 K/CU MM (ref 140–440)
PMV BLD AUTO: 9.7 FL (ref 7.5–11.1)
PROTHROMBIN TIME: 12.2 SECONDS (ref 11.7–14.5)
RBC # BLD: 4.83 M/CU MM (ref 4.2–5.4)
SEGMENTED NEUTROPHILS ABSOLUTE COUNT: 4 K/CU MM
SEGMENTED NEUTROPHILS RELATIVE PERCENT: 59.1 % (ref 36–66)
TOTAL IMMATURE NEUTOROPHIL: 0.03 K/CU MM
TOTAL NUCLEATED RBC: 0 K/CU MM
WBC # BLD: 6.8 K/CU MM (ref 4–10.5)

## 2021-04-19 PROCEDURE — 2580000003 HC RX 258: Performed by: NURSE ANESTHETIST, CERTIFIED REGISTERED

## 2021-04-19 PROCEDURE — 86850 RBC ANTIBODY SCREEN: CPT

## 2021-04-19 PROCEDURE — 3700000000 HC ANESTHESIA ATTENDED CARE: Performed by: UROLOGY

## 2021-04-19 PROCEDURE — 93005 ELECTROCARDIOGRAM TRACING: CPT | Performed by: ANESTHESIOLOGY

## 2021-04-19 PROCEDURE — 7100000010 HC PHASE II RECOVERY - FIRST 15 MIN: Performed by: UROLOGY

## 2021-04-19 PROCEDURE — 85610 PROTHROMBIN TIME: CPT

## 2021-04-19 PROCEDURE — 88307 TISSUE EXAM BY PATHOLOGIST: CPT | Performed by: PATHOLOGY

## 2021-04-19 PROCEDURE — 3700000001 HC ADD 15 MINUTES (ANESTHESIA): Performed by: UROLOGY

## 2021-04-19 PROCEDURE — 7100000011 HC PHASE II RECOVERY - ADDTL 15 MIN: Performed by: UROLOGY

## 2021-04-19 PROCEDURE — 7100000000 HC PACU RECOVERY - FIRST 15 MIN: Performed by: UROLOGY

## 2021-04-19 PROCEDURE — 7100000001 HC PACU RECOVERY - ADDTL 15 MIN: Performed by: UROLOGY

## 2021-04-19 PROCEDURE — 86901 BLOOD TYPING SEROLOGIC RH(D): CPT

## 2021-04-19 PROCEDURE — 6360000002 HC RX W HCPCS: Performed by: NURSE ANESTHETIST, CERTIFIED REGISTERED

## 2021-04-19 PROCEDURE — 86900 BLOOD TYPING SEROLOGIC ABO: CPT

## 2021-04-19 PROCEDURE — 3600000003 HC SURGERY LEVEL 3 BASE: Performed by: UROLOGY

## 2021-04-19 PROCEDURE — 85025 COMPLETE CBC W/AUTO DIFF WBC: CPT

## 2021-04-19 PROCEDURE — 2709999900 HC NON-CHARGEABLE SUPPLY: Performed by: UROLOGY

## 2021-04-19 PROCEDURE — 3600000013 HC SURGERY LEVEL 3 ADDTL 15MIN: Performed by: UROLOGY

## 2021-04-19 PROCEDURE — 6360000002 HC RX W HCPCS: Performed by: UROLOGY

## 2021-04-19 RX ORDER — LABETALOL HYDROCHLORIDE 5 MG/ML
5 INJECTION, SOLUTION INTRAVENOUS EVERY 10 MIN PRN
Status: DISCONTINUED | OUTPATIENT
Start: 2021-04-19 | End: 2021-04-19 | Stop reason: HOSPADM

## 2021-04-19 RX ORDER — ONDANSETRON 2 MG/ML
4 INJECTION INTRAMUSCULAR; INTRAVENOUS
Status: DISCONTINUED | OUTPATIENT
Start: 2021-04-19 | End: 2021-04-19 | Stop reason: HOSPADM

## 2021-04-19 RX ORDER — CIPROFLOXACIN 2 MG/ML
400 INJECTION, SOLUTION INTRAVENOUS ONCE
Status: COMPLETED | OUTPATIENT
Start: 2021-04-19 | End: 2021-04-19

## 2021-04-19 RX ORDER — HYDRALAZINE HYDROCHLORIDE 20 MG/ML
5 INJECTION INTRAMUSCULAR; INTRAVENOUS EVERY 10 MIN PRN
Status: DISCONTINUED | OUTPATIENT
Start: 2021-04-19 | End: 2021-04-19 | Stop reason: HOSPADM

## 2021-04-19 RX ORDER — FENTANYL CITRATE 50 UG/ML
INJECTION, SOLUTION INTRAMUSCULAR; INTRAVENOUS PRN
Status: DISCONTINUED | OUTPATIENT
Start: 2021-04-19 | End: 2021-04-19 | Stop reason: SDUPTHER

## 2021-04-19 RX ORDER — SODIUM CHLORIDE, SODIUM LACTATE, POTASSIUM CHLORIDE, CALCIUM CHLORIDE 600; 310; 30; 20 MG/100ML; MG/100ML; MG/100ML; MG/100ML
INJECTION, SOLUTION INTRAVENOUS CONTINUOUS PRN
Status: DISCONTINUED | OUTPATIENT
Start: 2021-04-19 | End: 2021-04-19 | Stop reason: SDUPTHER

## 2021-04-19 RX ORDER — FENTANYL CITRATE 50 UG/ML
50 INJECTION, SOLUTION INTRAMUSCULAR; INTRAVENOUS EVERY 5 MIN PRN
Status: DISCONTINUED | OUTPATIENT
Start: 2021-04-19 | End: 2021-04-19 | Stop reason: HOSPADM

## 2021-04-19 RX ORDER — ONDANSETRON 2 MG/ML
INJECTION INTRAMUSCULAR; INTRAVENOUS PRN
Status: DISCONTINUED | OUTPATIENT
Start: 2021-04-19 | End: 2021-04-19 | Stop reason: SDUPTHER

## 2021-04-19 RX ORDER — DEXAMETHASONE SODIUM PHOSPHATE 4 MG/ML
INJECTION, SOLUTION INTRA-ARTICULAR; INTRALESIONAL; INTRAMUSCULAR; INTRAVENOUS; SOFT TISSUE PRN
Status: DISCONTINUED | OUTPATIENT
Start: 2021-04-19 | End: 2021-04-19 | Stop reason: SDUPTHER

## 2021-04-19 RX ORDER — PROPOFOL 10 MG/ML
INJECTION, EMULSION INTRAVENOUS PRN
Status: DISCONTINUED | OUTPATIENT
Start: 2021-04-19 | End: 2021-04-19 | Stop reason: SDUPTHER

## 2021-04-19 RX ORDER — FENTANYL CITRATE 50 UG/ML
25 INJECTION, SOLUTION INTRAMUSCULAR; INTRAVENOUS EVERY 5 MIN PRN
Status: DISCONTINUED | OUTPATIENT
Start: 2021-04-19 | End: 2021-04-19 | Stop reason: HOSPADM

## 2021-04-19 RX ORDER — LIDOCAINE HYDROCHLORIDE 20 MG/ML
INJECTION, SOLUTION INTRAVENOUS PRN
Status: DISCONTINUED | OUTPATIENT
Start: 2021-04-19 | End: 2021-04-19 | Stop reason: SDUPTHER

## 2021-04-19 RX ADMIN — CIPROFLOXACIN 400 MG: 2 INJECTION, SOLUTION INTRAVENOUS at 09:16

## 2021-04-19 RX ADMIN — FENTANYL CITRATE 25 MCG: 50 INJECTION, SOLUTION INTRAMUSCULAR; INTRAVENOUS at 09:38

## 2021-04-19 RX ADMIN — PROPOFOL 100 MG: 10 INJECTION, EMULSION INTRAVENOUS at 09:22

## 2021-04-19 RX ADMIN — LIDOCAINE HYDROCHLORIDE 60 MG: 20 INJECTION, SOLUTION INTRAVENOUS at 09:22

## 2021-04-19 RX ADMIN — SODIUM CHLORIDE, POTASSIUM CHLORIDE, SODIUM LACTATE AND CALCIUM CHLORIDE: 600; 310; 30; 20 INJECTION, SOLUTION INTRAVENOUS at 08:30

## 2021-04-19 RX ADMIN — FENTANYL CITRATE 25 MCG: 50 INJECTION, SOLUTION INTRAMUSCULAR; INTRAVENOUS at 09:33

## 2021-04-19 RX ADMIN — ONDANSETRON 4 MG: 2 INJECTION INTRAMUSCULAR; INTRAVENOUS at 09:36

## 2021-04-19 RX ADMIN — DEXAMETHASONE SODIUM PHOSPHATE 4 MG: 4 INJECTION, SOLUTION INTRAMUSCULAR; INTRAVENOUS at 09:36

## 2021-04-19 RX ADMIN — FENTANYL CITRATE 50 MCG: 50 INJECTION, SOLUTION INTRAMUSCULAR; INTRAVENOUS at 09:40

## 2021-04-19 ASSESSMENT — PULMONARY FUNCTION TESTS
PIF_VALUE: 3
PIF_VALUE: 0
PIF_VALUE: 2
PIF_VALUE: 3
PIF_VALUE: 2
PIF_VALUE: 3
PIF_VALUE: 3
PIF_VALUE: 1
PIF_VALUE: 3
PIF_VALUE: 2
PIF_VALUE: 3
PIF_VALUE: 4
PIF_VALUE: 0
PIF_VALUE: 4
PIF_VALUE: 3
PIF_VALUE: 2
PIF_VALUE: 2
PIF_VALUE: 0
PIF_VALUE: 2

## 2021-04-19 ASSESSMENT — PAIN SCALES - GENERAL
PAINLEVEL_OUTOF10: 0
PAINLEVEL_OUTOF10: 0

## 2021-04-19 ASSESSMENT — PAIN - FUNCTIONAL ASSESSMENT: PAIN_FUNCTIONAL_ASSESSMENT: 0-10

## 2021-04-19 NOTE — PROGRESS NOTES
1200:  Discharge instructions given to both patient and daughter to include COVID education and morris cathter care. Both verbalized an understanding.

## 2021-04-19 NOTE — PROGRESS NOTES
1210:  Pt discharged to home alert and oriented with no c/o pain or discomfort. Anderson cathter in place draining pink blood tinged urine. Anderson catheter care given prior to discharge. Pt tolerating PO, transferred to POV with out incident.

## 2021-04-19 NOTE — PROGRESS NOTES
1008- Arrived to PACU in semi-fowlers position, monitors applied alarms on oriented to unit. Handoff report received from HANS Malone  1030- turned and repositioned tolerated well   1046- Phase 1 recovery complete  1057- transferred to [facility] per cart  1105- Handoff report given to [facility]

## 2021-04-19 NOTE — H&P
Department of Urology  Pre op H&P  [facility]    Date: 2021   Patient: Franco Mosley   : 1940   DOA: 2021   MRN: [id number]   ROOM#: OR/NONE     CHIEF COMPLAINT:  Bladder masses    History Obtained From:  patient, electronic medical record    HISTORY OF PRESENT ILLNESS:                The patient is a 79 y.o. female with significant past medical history of bladder cancer who presents for repeat TURBT.    78 yo female with h/o high grade non-invasive bladder cancer     Chronically on coumadin (sees Dr. Sherice Liao)     Office cystoscopy  with papillary lesion at the dome of the bladder     CT urogram  with bladder mass, possible 2  lesions    Urine culture from 21 w/o growth. Past Medical History:        Diagnosis Date    Abnormal urine     \"surgry cancelled on 2020- had abnormal urine so put me on antibiotics and did another urine at office 2020- have not heard if ok yet\"    Arthritis     Left hand    Atrial fibrillation (HCC)     **Coumadin Clinic follows PT/INRs,along w/prescribing Coumadin. ** - Dr. Ariana Fang Bladder infection     \"started on antibiotic 2021 for this\"    Bladder tumor     \"for surgery 2021 for this\"    Cancer [facility])     per old chart hx bladder CA in - , skin cancer removed left leg     H/O echocardiogram 2020    EF 55-60%, Severe AR &TR, Mild-Mod PHTN.    H/O transesophageal echocardiography (VEENA) for monitoring 10/15/2020    Moderately dilated right atrium. EF 50-55%, Severe AR, Mod to severe TR.    [place] (hard of hearing)     emily  no hearing aides    Hx of echocardiogram 2017    EF50-55%,moderate aortic regurg,mild to moderate pulmonary HTN, moderate tricusid regurg    Hyperlipidemia     Hypertension     Follows with Dr Ez Fitzgerald (sick sinus syndrome) ([facility] [address]     TIA (transient ischemic attack) 2012    Came to ER for evaluation - no residual effects per pt 2020-\"I lost feeling in my left side- lasted a couple of hours\"    Wears glasses     to read       Past Surgical History:        Procedure Laterality Date   [facility] BLADDER SURGERY  2016    [facility] [address] Normal exam per pt - Dr. Manoj Garcia 2/20/2020    CYSTOSCOPY TRANSURETHRAL RESECTION BLADDER performed by Carmelina Hunter MD at [facility] 3    collasped lung    PACEMAKER INSERTION  10/08/2008    medtronic - dr. Omaira Bruno  (new battery) 2018?  TUBAL LIGATION  1974       Current Medications:   No current facility-administered medications for this encounter. Allergies:  Simvastatin    Social History:   TOBACCO:   reports that she has been smoking cigarettes. She started smoking about 68 years ago. She has a 33.50 pack-year smoking history. She has never used smokeless tobacco.  ETOH:   reports no history of alcohol use. DRUGS:   reports no history of drug use. MARITAL STATUS:    OCCUPATION:      Family History:       Problem Relation Age of Onset    High Cholesterol Mother     Cancer Mother         \"cancer in her stomach- lymphoma\"    Diabetes Father     Heart Disease Brother        REVIEW OF SYSTEMS:  See HPI    PHYSICAL EXAM:    VITALS:  There were no vitals taken for this visit. TEMPERATURE:  Current -  ;   Max - No data recorded    24HR BLOOD PRESSURE RANGE:  No data recorded.  ; No data recorded. 8HR INTAKE OUTPUT:  No intake/output data recorded.     URINARY CATHETER OUTPUT (Anderson):     DRAIN/TUBE OUTPUT:        CONSTITUTIONAL:  awake, alert, cooperative, no apparent distress, and appears stated age  EYES:  Lids and lashes normal, pupils equal  NECK:  supple, symmetrical, trachea midline and skin normal  BACK:  Symmetric, no curvature, spinous processes are non-tender on palpation, paraspinous muscles are non-tender on palpation, no costal vertebral tenderness  LUNGS:  No increased work of breathing  ABDOMEN:  No scars, normal bowel sounds, soft, non-distended, non-tender, no masses palpated, no hepatosplenomegally      Data:    WBC:    Lab Results   Component Value Date    WBC 8.7 02/26/2021     Hemoglobin/Hematocrit:    Lab Results   Component Value Date    HGB 15.2 02/26/2021    HCT 47.4 02/26/2021     BMP:    Lab Results   Component Value Date     03/08/2021    K 4.0 03/08/2021     03/08/2021    CO2 29 03/08/2021    BUN 16 03/08/2021    LABALBU 4.5 05/27/2020    CREATININE 0.9 03/08/2021    CALCIUM 9.1 03/08/2021    GFRAA >60 03/08/2021    LABGLOM >60 03/08/2021     PT/INR:    Lab Results   Component Value Date    PROTIME 27.0 04/05/2021    INR 2.2 04/05/2021    INR 2.55 06/11/2020       Impression: 78 yo female with h/o hg TCC bladder with recurrent bladder masses and significant co-morbidities      Recommendation: cystoscopy, TURBT    F/u set for 5/5/21 at 1610      Patient seen and examined, chart reviewed.      Dayne Bui MD     Electronically signed at 4/19/2021

## 2021-04-19 NOTE — PROGRESS NOTES
1105:  Pt received from PACU alert and oriented with no c/o pain or discomfort. Anderson cath in place draining blood urine drainage, no clots noted. VSS, tolerating PO.

## 2021-04-19 NOTE — OP NOTE
Operative Note      Patient: Gabbie Ching  YOB: 1940  MRN: [id number]    Date of Procedure: 4/19/2021    Pre-Op Diagnosis: malignant neoplasm of bladder    Post-Op Diagnosis: Same       Procedure(s):  CYSTOSCOPY TRANSURETHRAL RESECTION BLADDER    Surgeon(s):  Mark Zhao MD    Assistant:   * No surgical staff found *    Anesthesia: General    Estimated Blood Loss (mL): Minimal    Complications: None    Specimens:   ID Type Source Tests Collected by Time Destination   A : bladder tissue Tissue Tissue SURGICAL PATHOLOGY Mark Zhao MD 4/19/2021 6728        Implants:  * No implants in log *      Drains:   Urethral Catheter Double-lumen 16 fr (Active)       Findings: mass at the dome of the bladder concerning for persistent high grade TCC    Detailed Description of Procedure: This patient is a 78 yo female with a known h/o 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 She is here for repeat TURBT today. Her pre operative urine culture from 4/16/21 had no growth. She signed the consent form after discussing the risks/benefits. The patient was brought to the operative suite, placed in supine position, and sterilely prepped and draped in the usual fashion. I personally conducted a timeout to ensure this is the proper operation the patient everybody in the room agreed. Rigid cystoscopy ensued. The bladder was drained and thoroughly irrigated and evacuated. Bladder was meticulously evaluated. The only abnormal pathology evident was a sessile lesion with some papillary features at the dome. The cystoscope was removed and a 24 Samoan sheath resectoscope was then placed. I then resected the lesion as best I could. Meticulous hemostasis was achieved. All the resected portions were handed off as specimen. No masses were palpable on transvaginal examination.   For fear of damage to the thickness of the bladder from the resection elected to place a 16 Greek catheter in place. This will be left in place for 48 hours. The bladder was drained to completion with the catheter. She has planned follow-up in the office for May 5, 2021 at 1610 .     Electronically signed by Sima Hubbard MD on 4/19/2021 at 9:54 AM

## 2021-04-19 NOTE — TELEPHONE ENCOUNTER
Patient called- surgery today and she was told to hold warfarin until catheter removed. She doesn't have appointment yet for removal, but expects it to be in next few days. Patient will call if warfarin held longer. CLINICAL PHARMACY CONSULT: MED RECONCILIATION/REVIEW ADDENDUM    For Pharmacy Admin Tracking Only    PHSO: No  Total # of Interventions Recommended:      Total Interventions Accepted: 0  Time Spent (min): 10    Vince WarnerD

## 2021-04-20 LAB
EKG ATRIAL RATE: 92 BPM
EKG DIAGNOSIS: NORMAL
EKG Q-T INTERVAL: 392 MS
EKG QRS DURATION: 88 MS
EKG QTC CALCULATION (BAZETT): 416 MS
EKG R AXIS: -55 DEGREES
EKG T AXIS: -55 DEGREES
EKG VENTRICULAR RATE: 68 BPM

## 2021-04-20 PROCEDURE — 93010 ELECTROCARDIOGRAM REPORT: CPT | Performed by: INTERNAL MEDICINE

## 2021-04-27 NOTE — ANESTHESIA POSTPROCEDURE EVALUATION
Department of Anesthesiology  Postprocedure Note    Patient: Brian Ramos  MRN: [id number]  YOB: 1940  Date of evaluation: 4/27/2021  Time:  3:49 PM     Procedure Summary     Date: 04/19/21 Room / Location: [place] [address] / [facility]    Anesthesia Start: 5810 Anesthesia Stop: 1010    Procedure: CYSTOSCOPY TRANSURETHRAL RESECTION BLADDER (N/A ) Diagnosis: (malignant neoplasm of bladder)    Surgeons: Hallie Ulrich MD Responsible Provider: Melanie Andrade MD    Anesthesia Type: general ASA Status: 4          Anesthesia Type: general    Paul Phase I: Paul Score: 9    Paul Phase II: Paul Score: 10    Last vitals: Reviewed and per EMR flowsheets.        Anesthesia Post Evaluation    Patient location during evaluation: PACU  Patient participation: complete - patient participated  Level of consciousness: sleepy but conscious  Pain score: 1  Airway patency: patent  Nausea & Vomiting: no nausea and no vomiting  Complications: no  Cardiovascular status: hemodynamically stable  Respiratory status: acceptable  Hydration status: euvolemic

## 2021-04-29 ENCOUNTER — ANTI-COAG VISIT (OUTPATIENT)
Dept: PHARMACY | Age: 81
End: 2021-04-29
Payer: MEDICARE

## 2021-04-29 DIAGNOSIS — G45.9 TIA (TRANSIENT ISCHEMIC ATTACK): ICD-10-CM

## 2021-04-29 DIAGNOSIS — I48.20 CHRONIC ATRIAL FIBRILLATION (HCC): ICD-10-CM

## 2021-04-29 LAB
INTERNATIONAL NORMALIZATION RATIO, POC: 1.3
POC INR: 1.3 INDEX
PROTHROMBIN TIME, POC: 15.8 SECONDS (ref 10–14.3)

## 2021-04-29 PROCEDURE — 99212 OFFICE O/P EST SF 10 MIN: CPT

## 2021-04-29 PROCEDURE — 85610 PROTHROMBIN TIME: CPT

## 2021-04-29 PROCEDURE — 36416 COLLJ CAPILLARY BLOOD SPEC: CPT

## 2021-04-29 NOTE — PROGRESS NOTES
Medication Management Service  [facility]  [phone]    Visit Date: 4/29/2021   Subjective:       Jose Miguel Weir is a 79 y.o. female who presents to clinic today for anticoagulation monitoring and adjustment. Patient seen in clinic for warfarin management due to  Indication:   atrial fibrillation. INR goal: of 2.0-3.0. Duration of therapy: indefinite. Patient reports the following:   Adherent with regimen  Missed or extra doses: held for 10 days for bladder surgery   Bleeding or thromboembolic side effects:  None  Significant medication changes:  None  Significant dietary changes: None  Significant alcohol or tobacco changes: None  Significant recent illness, disease state changes, or hospitalization:  None  Upcoming surgeries or procedures:  None  Falls: None           Assessment and Plan     PT/INR done in office per protocol. INR today is 1.3, subtherapeutic, due to held doses for bladder surgery. Plan:  Take warfarin 7.5mg today then will continue current regimen of warfarin 5mg daily except 2.5mg on Tuesdays. Recheck INR in 2.5 week(s). Patient verbalized understanding of dosing directions and information discussed. Dosing schedule given to patient including phone number, appointment date, and time. Progress note sent to referring office. Patient acknowledges working in consult agreement with pharmacist as referred by his/her physician.       Electronically signed by Coty Phillips [address] [place] [address] on 4/29/21 at 11:16 AM EDT    For Pharmacy Admin Tracking Only     Intervention Detail: Dose Adjustment: 1: reason: Therapy Optimization   Total # of Interventions Recommended: 1   Total # of Interventions Accepted: 1   Time Spent (min): 15

## 2021-04-30 RX ORDER — DIGOXIN 125 MCG
125 TABLET ORAL DAILY
Qty: 90 TABLET | Refills: 3 | Status: SHIPPED | OUTPATIENT
Start: 2021-04-30

## 2021-05-03 RX ORDER — METOPROLOL TARTRATE 50 MG/1
50 TABLET, FILM COATED ORAL 2 TIMES DAILY
Qty: 180 TABLET | Refills: 3 | Status: SHIPPED | OUTPATIENT
Start: 2021-05-03

## 2021-05-03 NOTE — TELEPHONE ENCOUNTER
Patient requested refill for Metoprolol.  Rx routed to [facility] for approval.

## 2021-05-04 ENCOUNTER — OFFICE VISIT (OUTPATIENT)
Dept: CARDIOLOGY CLINIC | Age: 81
End: 2021-05-04
Payer: MEDICARE

## 2021-05-04 VITALS
BODY MASS INDEX: 16.66 KG/M2 | HEIGHT: 65 IN | SYSTOLIC BLOOD PRESSURE: 100 MMHG | HEART RATE: 61 BPM | OXYGEN SATURATION: 99 % | WEIGHT: 100 LBS | DIASTOLIC BLOOD PRESSURE: 60 MMHG

## 2021-05-04 DIAGNOSIS — I10 ESSENTIAL HYPERTENSION: ICD-10-CM

## 2021-05-04 DIAGNOSIS — E78.5 HYPERLIPIDEMIA, UNSPECIFIED HYPERLIPIDEMIA TYPE: ICD-10-CM

## 2021-05-04 DIAGNOSIS — I48.0 PAF (PAROXYSMAL ATRIAL FIBRILLATION) (HCC): Primary | ICD-10-CM

## 2021-05-04 PROCEDURE — 4004F PT TOBACCO SCREEN RCVD TLK: CPT | Performed by: NURSE PRACTITIONER

## 2021-05-04 PROCEDURE — 1123F ACP DISCUSS/DSCN MKR DOCD: CPT | Performed by: NURSE PRACTITIONER

## 2021-05-04 PROCEDURE — 1090F PRES/ABSN URINE INCON ASSESS: CPT | Performed by: NURSE PRACTITIONER

## 2021-05-04 PROCEDURE — G8400 PT W/DXA NO RESULTS DOC: HCPCS | Performed by: NURSE PRACTITIONER

## 2021-05-04 PROCEDURE — G8419 CALC BMI OUT NRM PARAM NOF/U: HCPCS | Performed by: NURSE PRACTITIONER

## 2021-05-04 PROCEDURE — G8427 DOCREV CUR MEDS BY ELIG CLIN: HCPCS | Performed by: NURSE PRACTITIONER

## 2021-05-04 PROCEDURE — 99214 OFFICE O/P EST MOD 30 MIN: CPT | Performed by: NURSE PRACTITIONER

## 2021-05-04 PROCEDURE — 4040F PNEUMOC VAC/ADMIN/RCVD: CPT | Performed by: NURSE PRACTITIONER

## 2021-05-04 RX ORDER — VALSARTAN 320 MG/1
320 TABLET ORAL DAILY
Qty: 90 TABLET | Refills: 3 | Status: SHIPPED | OUTPATIENT
Start: 2021-05-04

## 2021-05-04 RX ORDER — FUROSEMIDE 20 MG/1
20 TABLET ORAL DAILY PRN
Qty: 90 TABLET | Refills: 1 | Status: ON HOLD
Start: 2021-05-04 | End: 2021-08-23

## 2021-05-04 RX ORDER — NIFEDIPINE 30 MG/1
30 TABLET, EXTENDED RELEASE ORAL DAILY
Qty: 90 TABLET | Refills: 3 | Status: SHIPPED | OUTPATIENT
Start: 2021-05-04

## 2021-05-04 NOTE — PROGRESS NOTES
[facility] [place]  [address] [facility],Third [address]  Phone: [phone]    Fax [phone]    Kyaw Greene MD, Mag Chen MD, Fredo Dubon MD, MD Faraz Castillo MD Warren Kaska, MD Jeannene Spiller, MD Valarie Merino, SHAYAN Lucas, SHAYAN Mendes, SHAYAN Hinojosa, APRN    CARDIOLOGY  NOTE    2021    Archie Barragan (:  1940) is a 79 y.o. female,Established patient with Dr. Travis Vivas, here for evaluation of the following chief complaint(s):  6 Month Follow-Up (Patient here for 6 month f/u with echo. Patient denies any chest pain, shortness of breath, dizziness, palpitations, and edema. )        SUBJECTIVE/OBJECTIVE:      Patient is her to follow up on the followin. PAF (paroxysmal atrial fibrillation) ([facility] [address]    2. Essential hypertension    3. Hyperlipidemia, unspecified hyperlipidemia type        Patient had bladder surgery with Dr. Meseret Atkins recently. Patient has not had any additional issues since surgery. She states that she is unsure of what the plan is next. Denies increase in weight. She is keeping better track of her sodium and her fluid content at this time. Patient is a smoker. Patient denies issues obtaining or taking medications. Patient is active and does not do organized exercise. Patient denies chest pain, shortness of breath, palpitations, dizziness, orthopnea, lower leg swelling, or syncope. Review of Systems   Constitutional: Positive for fatigue. Negative for fever. Respiratory: Negative for cough and shortness of breath. Cardiovascular: Negative for chest pain leg swelling and palpitations. Gastrointestinal:   Musculoskeletal: Negative for arthralgias abdominal distention and gait problem. Neurological: Negative for dizziness, syncope, weakness, light-headedness and headaches. Psychiatric/Behavioral: Positive for sleep disturbance.        Vitals:    21 1425   BP: 100/60   Pulse: 61   SpO2: 99%   Weight: 100 lb (45.4 kg)   Height: 5' 5\" (1.651 m)       Wt Readings from Last 3 Encounters:   05/04/21 100 lb (45.4 kg)   04/15/21 98 lb (44.5 kg)   04/19/21 98 lb (44.5 kg)       BP Readings from Last 3 Encounters:   05/04/21 100/60   04/19/21 (!) 108/56   04/15/21 123/65       All medications reviewed and confirmed with patient. Physical Exam  Vitals signs reviewed. Constitutional:       General: She is not in acute distress. Appearance: Normal appearance. She is not ill-appearing. HENT:      Head: Atraumatic. Neck:      Musculoskeletal: Neck supple. No muscular tenderness. Vascular: No carotid bruit. Cardiovascular:      Rate and Rhythm: Normal rate and regular rhythm. Pulses: Normal pulses. Heart sounds: Normal heart sounds. No murmur. Pulmonary:      Effort: Pulmonary effort is normal. No respiratory distress. Breath sounds: clear  Musculoskeletal:         General: No deformity. No swelling or edema  Neurological:      Mental Status: She is alert. Health Maintenance   Topic Date Due    Hepatitis C screen  Never done    COVID-19 Vaccine (1) Never done    DTaP/Tdap/Td vaccine (1 - Tdap) Never done    Shingles Vaccine (1 of 2) Never done    DEXA (modify frequency per FRAX score)  Never done    Pneumococcal 65+ years Vaccine (1 of 1 - PPSV23) Never done   [employer] Visit (AWV)  Never done    Lipid screen  05/27/2021    Flu vaccine (Season Ended) 09/01/2021    Potassium monitoring  03/08/2022    Creatinine monitoring  03/08/2022    Hepatitis A vaccine  Aged Out    Hepatitis B vaccine  Aged Out    Hib vaccine  Aged Out    Meningococcal (ACWY) vaccine  Aged Out     Notes reviewed: Dr. Natasha Gentile 19, 2021  Dr. Yuan Black 27, 2021    ASSESSMENT/PLAN:    1.  Chronic atrial fibrillation [facility])  Assessment & Plan:  Continues to have atrial fibrillation, Rate is well controlled  Continue coumadin for [facility]   Continue digoxin, and metoprolol for rate control  2. Nonrheumatic aortic valve insufficiency  Assessment & Plan:  · Patient is experiencing symptoms currently  · Low salt diet advised  · Last Echo 10/2020 Summary   Left ventricular systolic function is normal.   Ejection fraction is visually estimated at 50-55%. Moderately dilated right atrium. Severe aortic regurgitation is noted. Vena contracta measures 0.49 CM   The regurgitant jet occupies 50 % of LVOT   Sclerotic, but non-stenotic aortic valve. Moderate to severe tricuspid regurgitation is present. Tricuspid valve prolapse is present. Mild prolapse of the anterior mitral valve leaflet. There was no thrombus noted in the left atrial appendage. Negative bubble study. No PFO or ASD noted. 3. Shortness of breath  Improved. She is able to do more activity than what she had at last office visit. Patient states that she has not had any additional orthopnea. 4.  Heart failure preserved ejection fraction  · Patient is taking Lasix as she needs it maybe 1 day a week. Since she has decreased her fluid intake and is watching her sodium count. · We will continue to monitor fluid status and follow closely. Return in about 3 months (around 8/4/2021) for with Dr. Sade Lara, or sammi if needed. An electronic signature was used to authenticate this note.     Electronically signed by SHAYAN Lomeli CNP on 5/4/2021 at 2:39 PM

## 2021-05-17 ENCOUNTER — TELEPHONE (OUTPATIENT)
Dept: PHARMACY | Age: 81
End: 2021-05-17

## 2021-05-17 NOTE — TELEPHONE ENCOUNTER
No show to appointment. No answer and no voicemail on cell. Home phone busy.     For Pharmacy Admin Tracking Only     Intervention Detail:    Total # of Interventions Recommended:    Total # of Interventions Accepted:    Time Spent (min): 5

## 2021-05-18 ENCOUNTER — TELEPHONE (OUTPATIENT)
Dept: CARDIOLOGY CLINIC | Age: 81
End: 2021-05-18

## 2021-05-18 NOTE — TELEPHONE ENCOUNTER
Called to remind patient to send carellink transmission. Patients number went to voice mail and does not have voice mail set up. Called daughter Josh Lyman and left message asking her to have mom send carelink transmission.

## 2021-05-19 ENCOUNTER — TELEPHONE (OUTPATIENT)
Dept: PHARMACY | Age: 81
End: 2021-05-19

## 2021-05-19 NOTE — TELEPHONE ENCOUNTER
Patient left  requesting appointment. Called back. Scheduled for Monday 5/24.     For Pharmacy Admin Tracking Only       Time Spent (min): 5

## 2021-05-24 ENCOUNTER — ANTI-COAG VISIT (OUTPATIENT)
Dept: PHARMACY | Age: 81
End: 2021-05-24
Payer: MEDICARE

## 2021-05-24 DIAGNOSIS — G45.9 TIA (TRANSIENT ISCHEMIC ATTACK): ICD-10-CM

## 2021-05-24 DIAGNOSIS — I48.20 CHRONIC ATRIAL FIBRILLATION (HCC): Primary | ICD-10-CM

## 2021-05-24 LAB
INTERNATIONAL NORMALIZATION RATIO, POC: 1.9
POC INR: 1.9 INDEX
PROTHROMBIN TIME, POC: 23.1 SECONDS (ref 10–14.3)

## 2021-05-24 PROCEDURE — 36416 COLLJ CAPILLARY BLOOD SPEC: CPT

## 2021-05-24 PROCEDURE — 99212 OFFICE O/P EST SF 10 MIN: CPT

## 2021-05-24 PROCEDURE — 85610 PROTHROMBIN TIME: CPT

## 2021-05-24 NOTE — PROGRESS NOTES
Medication Management Service  [facility]  [phone]    Visit Date: 5/24/2021   Subjective:       Archie Barragan is a 79 y.o. female who presents to clinic today for anticoagulation monitoring and adjustment. Patient seen in clinic for warfarin management due to  Indication:   atrial fibrillation. INR goal: of 2.0-3.0. Duration of therapy: indefinite. Patient reports the following:   Adherent with regimen  Missed or extra doses:  None   Bleeding or thromboembolic side effects:  None  Significant medication changes:  None  Significant dietary changes: None  Significant alcohol or tobacco changes: None  Significant recent illness, disease state changes, or hospitalization:  None  Upcoming surgeries or procedures:  None  Falls: None           Assessment and Plan     PT/INR done in office per protocol. INR today is 1.9, subtherapeutic. Likely will start drinking Boost or Ensure at the direction of PCP for weight loss. Increase dose will be needed with increased vitamin K intake in boost or Ensure. Plan:   Increase weekly dose ~8% to warfarin 5mg daily. Recheck INR in 2.5 week(s). Patient verbalized understanding of dosing directions and information discussed. Dosing schedule given to patient including phone number, appointment date, and time. Progress note sent to referring office. Patient acknowledges working in consult agreement with pharmacist as referred by his/her physician.       Electronically signed by Dante Strange [facility] on 5/24/21 at 3:16 PM EDT    For Pharmacy Admin Tracking Only     Intervention Detail: Dose Adjustment: 1: reason: Therapy Optimization   Total # of Interventions Recommended: 1   Total # of Interventions Accepted: 1   Time Spent (min): 15

## 2021-06-10 ENCOUNTER — ANTI-COAG VISIT (OUTPATIENT)
Dept: PHARMACY | Age: 81
End: 2021-06-10
Payer: MEDICARE

## 2021-06-10 DIAGNOSIS — G45.9 TIA (TRANSIENT ISCHEMIC ATTACK): ICD-10-CM

## 2021-06-10 DIAGNOSIS — I48.20 CHRONIC ATRIAL FIBRILLATION (HCC): Primary | ICD-10-CM

## 2021-06-10 LAB
INTERNATIONAL NORMALIZATION RATIO, POC: 2
POC INR: 2 INDEX
PROTHROMBIN TIME, POC: 23.8 SECONDS (ref 10–14.3)

## 2021-06-10 PROCEDURE — 85610 PROTHROMBIN TIME: CPT

## 2021-06-10 PROCEDURE — 99211 OFF/OP EST MAY X REQ PHY/QHP: CPT

## 2021-06-10 PROCEDURE — 36416 COLLJ CAPILLARY BLOOD SPEC: CPT

## 2021-07-03 RX ORDER — WARFARIN SODIUM 5 MG/1
TABLET ORAL
Qty: 96 TABLET | Refills: 1 | Status: SHIPPED | OUTPATIENT
Start: 2021-07-03 | End: 2022-01-04

## 2021-07-08 ENCOUNTER — ANTI-COAG VISIT (OUTPATIENT)
Dept: PHARMACY | Age: 81
End: 2021-07-08
Payer: MEDICARE

## 2021-07-08 DIAGNOSIS — G45.9 TIA (TRANSIENT ISCHEMIC ATTACK): ICD-10-CM

## 2021-07-08 DIAGNOSIS — I48.20 CHRONIC ATRIAL FIBRILLATION (HCC): Primary | ICD-10-CM

## 2021-07-08 LAB
INTERNATIONAL NORMALIZATION RATIO, POC: 2.4
POC INR: 2.4 INDEX
PROTHROMBIN TIME, POC: 29.2 SECONDS (ref 10–14.3)

## 2021-07-08 PROCEDURE — 99211 OFF/OP EST MAY X REQ PHY/QHP: CPT

## 2021-07-08 PROCEDURE — 36416 COLLJ CAPILLARY BLOOD SPEC: CPT

## 2021-07-08 PROCEDURE — 85610 PROTHROMBIN TIME: CPT

## 2021-07-08 NOTE — PROGRESS NOTES
Medication Management Service  [facility]  [phone]    Visit Date: 7/8/2021   Subjective:       Bernard Dallas is a 79 y.o. female who presents to clinic today for anticoagulation monitoring and adjustment. Patient seen in clinic for warfarin management due to  Indication:   atrial fibrillation. INR goal: of 2.0-3.0. Duration of therapy: indefinite. Patient reports the following:   Adherent with regimen  Missed or extra doses:  None   Bleeding or thromboembolic side effects:  None  Significant medication changes:  Starting Myrbetriq  Significant dietary changes: None  Significant alcohol or tobacco changes: None  Significant recent illness, disease state changes, or hospitalization:  None  Upcoming surgeries or procedures:  Bladder procedure- will call once scheduled  Falls: None           Assessment and Plan     PT/INR done in office per protocol. INR today is 2.4, therapeutic. Plan: Will continue current regimen of warfarin 5mg daily. Recheck INR in 4 week(s). Patient verbalized understanding of dosing directions and information discussed. Dosing schedule given to patient including phone number, appointment date, and time. Progress note sent to referring office. Patient acknowledges working in consult agreement with pharmacist as referred by his/her physician.       Electronically signed by Ibis Schrader [address] [place] [address] on 7/8/21 at 11:44 AM EDT    For Pharmacy Admin Tracking Only     Intervention Detail:    Total # of Interventions Recommended:    Total # of Interventions Accepted:    Time Spent (min): 15

## 2021-07-23 RX ORDER — PRAVASTATIN SODIUM 10 MG
TABLET ORAL
Qty: 90 TABLET | Refills: 1 | Status: SHIPPED | OUTPATIENT
Start: 2021-07-23

## 2021-07-27 ENCOUNTER — TELEPHONE (OUTPATIENT)
Dept: CARDIOLOGY CLINIC | Age: 81
End: 2021-07-27

## 2021-07-27 NOTE — TELEPHONE ENCOUNTER
Cardiologist: Dr. Linda Hodge  Surgeon: Dr. Corinne Nova  Surgery: cystoscopy, TURB (Bladder) Tumor  Anesthesia: General  Date: 8/23/2021  FAX# [phone]  # [phone]    Last OV 5/4/2021 w/Milena      1. Chronic atrial fibrillation (HCC)  Assessment & Plan:  Continues to have atrial fibrillation, Rate is well controlled  Continue coumadin for [facility]   Continue digoxin, and metoprolol for rate control  2. Nonrheumatic aortic valve insufficiency  Assessment & Plan:  · Patient is experiencing symptoms currently  · Low salt diet advised  · Last Echo 10/2020 Summary   Left ventricular systolic function is normal.   Ejection fraction is visually estimated at 50-55%.   Moderately dilated right atrium.   Severe aortic regurgitation is noted. Vena contracta measures 0.49 CM   The regurgitant jet occupies 50 % of LVOT   Sclerotic, but non-stenotic aortic valve.   Moderate to severe tricuspid regurgitation is present.   Tricuspid valve prolapse is present.   Mild prolapse of the anterior mitral valve leaflet.   There was no thrombus noted in the left atrial appendage.   Negative bubble study. No PFO or ASD noted.     3. Shortness of breath  Improved. She is able to do more activity than what she had at last office visit. Patient states that she has not had any additional orthopnea.     4.  Heart failure preserved ejection fraction  · Patient is taking Lasix as she needs it maybe 1 day a week. Since she has decreased her fluid intake and is watching her sodium count. · We will continue to monitor fluid status and follow closely. NM- 7/25/2014    Echo- 3/6/2021  Left ventricular systolic function is normal with an ejection fraction of   50-55%. Mild concentric left ventricular hypertrophy. Mild right atrial dilation. Sclerotic, but non-stenotic aortic valve. Moderate to Severe aortic regurgitation. PHT is 285 MSec   Moderate tricuspid and mitral regurgitation.    Pacer wire noted on right side passing through tricuspid valve.   Severe pulmonary hypertension at 74 mmHg. No evidence of pericardial effusion. Dilated aortic root at 3.9 cm.       Pacemaker placement- 10/8/2008    Coumadin

## 2021-08-04 ENCOUNTER — OFFICE VISIT (OUTPATIENT)
Dept: CARDIOLOGY CLINIC | Age: 81
End: 2021-08-04
Payer: MEDICARE

## 2021-08-04 VITALS
OXYGEN SATURATION: 100 % | HEART RATE: 87 BPM | SYSTOLIC BLOOD PRESSURE: 122 MMHG | WEIGHT: 94.4 LBS | HEIGHT: 65 IN | BODY MASS INDEX: 15.73 KG/M2 | DIASTOLIC BLOOD PRESSURE: 70 MMHG

## 2021-08-04 DIAGNOSIS — Z01.818 PRE-OPERATIVE EXAM: ICD-10-CM

## 2021-08-04 DIAGNOSIS — Z72.0 TOBACCO ABUSE: ICD-10-CM

## 2021-08-04 DIAGNOSIS — I48.0 PAF (PAROXYSMAL ATRIAL FIBRILLATION) (HCC): ICD-10-CM

## 2021-08-04 DIAGNOSIS — E78.2 MIXED HYPERLIPIDEMIA: Primary | ICD-10-CM

## 2021-08-04 DIAGNOSIS — I10 ESSENTIAL HYPERTENSION: ICD-10-CM

## 2021-08-04 DIAGNOSIS — I35.1 NONRHEUMATIC AORTIC VALVE INSUFFICIENCY: ICD-10-CM

## 2021-08-04 DIAGNOSIS — I50.32 CHRONIC DIASTOLIC CONGESTIVE HEART FAILURE (HCC): ICD-10-CM

## 2021-08-04 PROCEDURE — 4004F PT TOBACCO SCREEN RCVD TLK: CPT | Performed by: NURSE PRACTITIONER

## 2021-08-04 PROCEDURE — 99214 OFFICE O/P EST MOD 30 MIN: CPT | Performed by: NURSE PRACTITIONER

## 2021-08-04 PROCEDURE — G8419 CALC BMI OUT NRM PARAM NOF/U: HCPCS | Performed by: NURSE PRACTITIONER

## 2021-08-04 PROCEDURE — 1090F PRES/ABSN URINE INCON ASSESS: CPT | Performed by: NURSE PRACTITIONER

## 2021-08-04 PROCEDURE — G8400 PT W/DXA NO RESULTS DOC: HCPCS | Performed by: NURSE PRACTITIONER

## 2021-08-04 PROCEDURE — 1123F ACP DISCUSS/DSCN MKR DOCD: CPT | Performed by: NURSE PRACTITIONER

## 2021-08-04 PROCEDURE — G8427 DOCREV CUR MEDS BY ELIG CLIN: HCPCS | Performed by: NURSE PRACTITIONER

## 2021-08-04 PROCEDURE — 4040F PNEUMOC VAC/ADMIN/RCVD: CPT | Performed by: NURSE PRACTITIONER

## 2021-08-04 RX ORDER — DOXYCYCLINE HYCLATE 100 MG
TABLET ORAL
COMMUNITY
Start: 2021-08-01 | End: 2021-08-18

## 2021-08-04 NOTE — PROGRESS NOTES
Negative for arthralgias abdominal distention and gait problem. Neurological: Negative for dizziness, syncope, weakness, light-headedness and headaches. Psychiatric/Behavioral: Positive for sleep disturbance. Vitals:    08/04/21 0832   BP: 122/70   Site: Left Upper Arm   Position: Sitting   Cuff Size: Medium Adult   Pulse: 87   SpO2: 100%   Weight: 94 lb 6.4 oz (42.8 kg)   Height: 5' 5\" (1.651 m)       Wt Readings from Last 3 Encounters:   08/04/21 94 lb 6.4 oz (42.8 kg)   05/04/21 100 lb (45.4 kg)   04/15/21 98 lb (44.5 kg)       BP Readings from Last 3 Encounters:   08/04/21 122/70   05/04/21 100/60   04/19/21 (!) 108/56       All medications reviewed and confirmed with patient. Physical Exam  Vitals signs reviewed. Constitutional:       General: She is not in acute distress. Appearance: Normal appearance. She is not ill-appearing. HENT:      Head: Atraumatic. Neck:      Musculoskeletal: Neck supple. No muscular tenderness. Vascular: No carotid bruit. Cardiovascular:      Rate and Rhythm: Normal rate and regular rhythm. Pulses: Normal pulses. Heart sounds: Normal heart sounds. No murmur. Pulmonary:      Effort: Pulmonary effort is normal. No respiratory distress. Breath sounds: clear  Musculoskeletal:         General: No deformity. No swelling or edema  Neurological:      Mental Status: She is alert.        Health Maintenance   Topic Date Due    Hepatitis C screen  Never done    DTaP/Tdap/Td vaccine (1 - Tdap) Never done    Shingles Vaccine (1 of 2) Never done    DEXA (modify frequency per FRAX score)  Never done    Pneumococcal 65+ years Vaccine (1 of 1 - PPSV23) Never done    Low dose CT lung screening  08/06/2013    Annual Wellness Visit (AWV)  Never done    Lipid screen  05/27/2021    Flu vaccine (1) 09/01/2021    Potassium monitoring  03/08/2022    Creatinine monitoring  03/08/2022    COVID-19 Vaccine  Completed    Hepatitis A vaccine  Aged C/ Narendra Say 19 Hepatitis B vaccine  Aged Out    Hib vaccine  Aged Out    Meningococcal (ACWY) vaccine  Aged Out     Notes reviewed: Dr. Razia Cabral 19, 2021  Dr. Bari Russell 27, 2021    ASSESSMENT/PLAN:    1. Chronic atrial fibrillation (HCC)  Assessment & Plan:  Continues to have atrial fibrillation, Rate is well controlled  Continue coumadin for [facility]   Continue digoxin, and metoprolol for rate control  2. Nonrheumatic aortic valve insufficiency  Assessment & Plan:  · Patient is not experiencing symptoms currently  · Low salt diet advised  · Will check echo if symptoms return  3. Shortness of breath  · Improved. · She is able to do more activity than what she had at last office visit. Patient states that she has not had any additional orthopnea. 4.  Heart failure preserved ejection fraction  · Patient is taking Lasix as she needs it maybe 1 day a week. Since she has decreased her fluid intake and is watching her sodium count. · We will continue to monitor fluid status and follow closely. · Will check chest x ray the Monday prior to surgery to make sure CHF is well controlled prior to surgery. · She is considered moderate to high risk for surgery  · Follow up 1 week post op in office. Return in about 1 week (around 8/11/2021) for Post op, or sonner if needed. An electronic signature was used to authenticate this note.     Electronically signed by SHAYAN Johansen CNP on 8/4/2021 at 8:45 AM

## 2021-08-04 NOTE — LETTER
Stacy Mercer Ana  1940  [id number]    Have you had any Chest Pain that is not new? - No      Have you had any Shortness of Breath - Yes  If Yes - When on exertion    Have you had any dizziness - No    Have you had any palpitations that are not new? - No     Do you have any edema - swelling in No      Do you have a surgery or procedure scheduled in the near future - Yes    If Yes - Who is the surgery with?  Dr. Holland Montalvo   Phone number of physician - [phone]  Type of surgery - bladder surgery   Scheduled for - 8/23/2021

## 2021-08-04 NOTE — PROGRESS NOTES
[id number] Score for Atrial Fibrillation Stroke Risk   Risk   Factors  Component Value   C CHF Yes 1   H HTN Yes 1   A2 Age >= 76 Yes,  (76 y.o.) 2   D DM No 0   S2 Prior Stroke/TIA Yes 2   V Vascular Disease No 0   A Age 74-69 No,  (76 y.o.) 0   Sc Sex female 1    [id number]  Score  7   Score last updated 8/4/21 9:88 AM EDT    Click here for a link to the UpToDate guideline \"Atrial Fibrillation: Anticoagulation therapy to prevent embolization    Disclaimer: Risk Score calculation is dependent on accuracy of patient problem list and past encounter diagnosis.

## 2021-08-05 ENCOUNTER — ANTI-COAG VISIT (OUTPATIENT)
Dept: PHARMACY | Age: 81
End: 2021-08-05
Payer: MEDICARE

## 2021-08-05 DIAGNOSIS — G45.9 TIA (TRANSIENT ISCHEMIC ATTACK): ICD-10-CM

## 2021-08-05 DIAGNOSIS — I48.20 CHRONIC ATRIAL FIBRILLATION (HCC): Primary | ICD-10-CM

## 2021-08-05 LAB
INTERNATIONAL NORMALIZATION RATIO, POC: 6.2
POC INR: 6.2 INDEX
PROTHROMBIN TIME, POC: 75 SECONDS (ref 10–14.3)

## 2021-08-05 PROCEDURE — 85610 PROTHROMBIN TIME: CPT

## 2021-08-05 PROCEDURE — 99212 OFFICE O/P EST SF 10 MIN: CPT

## 2021-08-05 PROCEDURE — 36416 COLLJ CAPILLARY BLOOD SPEC: CPT

## 2021-08-05 NOTE — PROGRESS NOTES
Medication Management Service  [facility]  [phone]    Visit Date: 8/5/2021   Subjective:       Jameel Bright is a 79 y.o. female who presents to clinic today for anticoagulation monitoring and adjustment. Patient seen in clinic for warfarin management due to  Indication:   atrial fibrillation. INR goal: of 2.0-3.0. Duration of therapy: indefinite. Patient reports the following:   Adherent with regimen  Missed or extra doses:  None   Bleeding or thromboembolic side effects:  None  Significant medication changes:  doxycycline  Significant dietary changes: decreased appetite  Significant alcohol or tobacco changes: None  Significant recent illness, disease state changes, or hospitalization:  None  Upcoming surgeries or procedures:  None  Falls: None           Assessment and Plan     PT/INR done in office per protocol. INR today is 6.2, supratherapeutic, likely due to doxycycline interaction, decreased appetite, and decreased activity. Patient started doxycycline 100mg BID for 7 days on 8/1/21 after Macrobid for UTI. Patient reports she was very weak and not eating much  and started feeling better yesterday and appetite increasing. Doxycycline can increase INR. Peg Umberto has no interaction with warfarin. Patient resistant to returning on Monday and would like to minimize appointments. Bladder surgery 8/23 at [facility]. Plan:  Hold warfarin for 3 days then take warfarin 2.5mg on 8/7. Patient advised of increased risk of bleeding at current INR. Advised patient to avoid activities that increase risk of falling or cutting him/herself. Advised patient to go to ER for fall, head injury, or bleeding. Patient voiced understanding. Recheck INR in 4 days. Patient verbalized understanding of dosing directions and information discussed. Dosing schedule given to patient including phone number, appointment date, and time. Progress note sent to referring office.   Patient acknowledges working in consult agreement with pharmacist as referred by his/her physician.       Electronically signed by Mi Garcia [facility] on 8/5/21 at 11:42 AM EDT    For Pharmacy Admin Tracking Only     Intervention Detail: Dose Adjustment: 1, reason: Therapy Optimization   Total # of Interventions Recommended: 1   Total # of Interventions Accepted: 1   Time Spent (min): 20

## 2021-08-09 ENCOUNTER — HOSPITAL ENCOUNTER (OUTPATIENT)
Age: 81
Discharge: HOME | End: 2021-08-09
Payer: MEDICARE

## 2021-08-09 ENCOUNTER — ANTI-COAG VISIT (OUTPATIENT)
Dept: PHARMACY | Age: 81
End: 2021-08-09
Payer: MEDICARE

## 2021-08-09 ENCOUNTER — HOSPITAL ENCOUNTER (OUTPATIENT)
Dept: GENERAL RADIOLOGY | Age: 81
Discharge: HOME | End: 2021-08-09
Payer: MEDICARE

## 2021-08-09 DIAGNOSIS — G45.9 TIA (TRANSIENT ISCHEMIC ATTACK): ICD-10-CM

## 2021-08-09 DIAGNOSIS — I48.20 CHRONIC ATRIAL FIBRILLATION (HCC): Primary | ICD-10-CM

## 2021-08-09 DIAGNOSIS — Z01.818 PRE-OPERATIVE EXAM: ICD-10-CM

## 2021-08-09 DIAGNOSIS — I50.32 CHRONIC DIASTOLIC CONGESTIVE HEART FAILURE (HCC): ICD-10-CM

## 2021-08-09 LAB
INTERNATIONAL NORMALIZATION RATIO, POC: 2
POC INR: 2 INDEX
PROTHROMBIN TIME, POC: 24.6 SECONDS (ref 10–14.3)

## 2021-08-09 PROCEDURE — 36416 COLLJ CAPILLARY BLOOD SPEC: CPT

## 2021-08-09 PROCEDURE — 85610 PROTHROMBIN TIME: CPT

## 2021-08-09 PROCEDURE — 99212 OFFICE O/P EST SF 10 MIN: CPT

## 2021-08-09 PROCEDURE — 71046 X-RAY EXAM CHEST 2 VIEWS: CPT

## 2021-08-09 NOTE — PROGRESS NOTES
Medication Management Service  [facility]  [phone]    Visit Date: 8/9/2021   Subjective:       Eros Greene is a 79 y.o. female who presents to clinic today for anticoagulation monitoring and adjustment. Patient seen in clinic for warfarin management due to  Indication:   atrial fibrillation. INR goal: of 2.0-3.0. Duration of therapy: indefinite. Patient reports the following:   Adherent with regimen  Missed or extra doses:  None   Bleeding or thromboembolic side effects:  Blood in urine started last night  Significant medication changes:  Doxycycline ending tonight   Significant dietary changes: None  Significant alcohol or tobacco changes: None  Significant recent illness, disease state changes, or hospitalization:  None  Upcoming surgeries or procedures:  Bladder surgery 8/23  Falls: None           Assessment and Plan     PT/INR done in office per protocol. INR today is 2.0, therapeutic, after 3 held doses and 1 day of 2.5mg after INR of 6.2 on 8/5/21. Patient reports blood in urine each time starting last night. Advised patient to contact Dr. Salima Scott. Patient scheduled for surgery 8/23- resection of bladder tumor. Patient was instructed by surgeon to hold warfarin for 5 days prior to surgery. Advised patient to restart warfarin at direction of surgery. Patient held warfarin until catheter was removed after bladder surgery in April of 2021. Patient's [id number] score is 7, placing at high risk of camila-procedural thromboembolism. However, patient undergoing surgery with high bleeding risk. Cardiac clearance from office of her cardiologist 7/30/21 notes anticoagulant therapy to be held at discretion of surgeon. Plan: Decrease weekly dose ~7% to warfarin 5mg daily except 2.5mg on Thursdays. Hold and restart warfarin at discretion of surgeon. Recheck INR in 2.5 week(s).      Patient verbalized understanding of dosing directions and information discussed. Dosing schedule given to patient including phone number, appointment date, and time. Progress note sent to referring office. Patient acknowledges working in consult agreement with pharmacist as referred by his/her physician.       Electronically signed by Estevan Mejia [address] [place] [address] on 8/9/21 at 10:48 AM EDT    For Pharmacy Admin Tracking Only     Intervention Detail: Dose Adjustment: 1, reason: Therapy Optimization   Total # of Interventions Recommended: 1   Total # of Interventions Accepted: 1   Time Spent (min): 15

## 2021-08-18 RX ORDER — NITROFURANTOIN MACROCRYSTALS 100 MG/1
100 CAPSULE ORAL 2 TIMES DAILY
COMMUNITY

## 2021-08-20 ENCOUNTER — ANESTHESIA EVENT (OUTPATIENT)
Dept: OPERATING ROOM | Age: 81
End: 2021-08-20
Payer: MEDICARE

## 2021-08-20 ASSESSMENT — LIFESTYLE VARIABLES: SMOKING_STATUS: 1

## 2021-08-20 NOTE — ANESTHESIA PRE PROCEDURE
Department of Anesthesiology  Preprocedure Note       Name:  Zohreh Collins   Age:  79 y.o.  :  1940                                          MRN:  [id number]         Date:  2021      Surgeon: Jessica Kaplan):  Eligio Hutson MD    Procedure: Procedure(s):  CYSTOSCOPY TRANSURETHRAL RESECTION BLADDER TUMOR    Medications prior to admission:   Prior to Admission medications    Medication Sig Start Date End Date Taking? Authorizing Provider   nitrofurantoin (MACRODANTIN) 100 MG capsule Take 100 mg by mouth 2 times daily   Yes Historical Provider, MD   pravastatin (PRAVACHOL) 10 MG tablet TAKE ONE TABLET BY MOUTH DAILY 21  Yes SHAYAN Resendiz CNP   furosemide (LASIX) 20 MG tablet Take 1 tablet by mouth daily as needed 21  Yes SHAYAN Resendiz CNP   NIFEdipine (PROCARDIA XL) 30 MG extended release tablet Take 1 tablet by mouth daily 21  Yes SHAYAN Resendiz CNP   valsartan (DIOVAN) 320 MG tablet Take 1 tablet by mouth daily 21  Yes SHAYAN Resendiz CNP   metoprolol tartrate (LOPRESSOR) 50 MG tablet Take 1 tablet by mouth 2 times daily 5/3/21  Yes SHAYAN Resendiz CNP   digoxin (LANOXIN) 125 MCG tablet Take 1 tablet by mouth daily 21  Yes SHAYAN Resendiz CNP   Mirabegron (MYRBETRIQ PO) Take by mouth Received samples from office    Historical Provider, MD   warfarin (COUMADIN) 5 MG tablet TAKE ONE TABLET BY MOUTH DAILY EXCEPT TAKE 1 1/2 TABLETS ON  AND  AS DIRECTED 7/3/21   Jose Chambers MD       Current medications:    No current facility-administered medications for this encounter.      Current Outpatient Medications   Medication Sig Dispense Refill    nitrofurantoin (MACRODANTIN) 100 MG capsule Take 100 mg by mouth 2 times daily      pravastatin (PRAVACHOL) 10 MG tablet TAKE ONE TABLET BY MOUTH DAILY 90 tablet 1    furosemide (LASIX) 20 MG tablet Take 1 tablet by mouth daily as needed 90 tablet 1    NIFEdipine (PROCARDIA 06/16/2020    EF 55-60%, Severe AR &TR, Mild-Mod PHTN.    H/O transesophageal echocardiography (VEENA) for monitoring 10/15/2020    Moderately dilated right atrium. EF 50-55%, Severe AR, Mod to severe TR.    [place] (hard of hearing)     emily  no hearing aides    Hx of echocardiogram 04/05/2017    EF50-55%,moderate aortic regurg,mild to moderate pulmonary HTN, moderate tricusid regurg    Hyperlipidemia     Hypertension     [facility]     TIA (transient ischemic attack) 06/24/2012    Came to ER for evaluation - no residual effects per pt 2/18/2020-\"I lost feeling in my left side- lasted a couple of hours\"    Wears glasses     to read       Past Surgical History:        Procedure Laterality Date   [place] 2016    cyst removed @ [facility]  ?? Normal exam per pt - Dr. Marjorie Reid 2/20/2020    CYSTOSCOPY TRANSURETHRAL RESECTION BLADDER [facility] [address] [facility]    CYSTOSCOPY TRANSURETHRAL RESECTION BLADDER performed by [facility]el[facility]ino [facility] at [facility] 3    collasped lung    PACEMAKER INSERTION  10/08/2008    medtronic - dr. Therese Inman  (new battery) 2018?  PACEMAKER PLACEMENT      TUBAL LIGATION  1974       Social History:    Social History     Tobacco Use    Smoking status: Current Every Day Smoker     Packs/day: 0.50     Years: 67.00     Pack years: 33.50     Types: Cigarettes     Start date: 1953    Smokeless tobacco: Never Used    Tobacco comment: 5 or 6 cigarettes a day    Substance Use Topics    Alcohol use:  No                                Ready to quit: Not Answered  Counseling given: Not Answered  Comment: 5 or 6 cigarettes a day       Vital Signs (Current):   Vitals:    08/18/21 1710   Weight: 94 lb (42.6 kg)   Height: 5' 5\" (1.651 m)                                              BP Readings from Last 3 Encounters:   08/04/21 122/70   05/04/21 100/60   04/19/21 (!) 108/56       NPO Status:                                                                                 BMI:   Wt Readings from Last 3 Encounters:   08/04/21 94 lb 6.4 oz (42.8 kg)   05/04/21 100 lb (45.4 kg)   04/15/21 98 lb (44.5 kg)     Body mass index is 15.64 kg/m². CBC:   Lab Results   Component Value Date    WBC 6.8 04/19/2021    RBC 4.83 04/19/2021    HGB 14.7 04/19/2021    HCT 45.4 04/19/2021    MCV 94.0 04/19/2021    RDW 14.4 04/19/2021     04/19/2021       CMP:   Lab Results   Component Value Date     03/08/2021    K 4.0 03/08/2021     03/08/2021    CO2 29 03/08/2021    BUN 16 03/08/2021    CREATININE 0.9 03/08/2021    GFRAA >60 03/08/2021    LABGLOM >60 03/08/2021    GLUCOSE 82 03/08/2021    PROT 6.2 05/27/2020    PROT 6.1 06/24/2012    CALCIUM 9.1 03/08/2021    BILITOT 1.4 05/27/2020    ALKPHOS 85 05/27/2020    AST 16 05/27/2020    ALT 10 05/27/2020       POC Tests: No results for input(s): POCGLU, POCNA, POCK, POCCL, POCBUN, POCHEMO, POCHCT in the last 72 hours.     Coags:   Lab Results   Component Value Date    PROTIME 24.6 08/09/2021    INR 2.0 08/09/2021    INR 2.0 08/09/2021    INR 1.01 04/19/2021    APTT 34.3 08/22/2016       HCG (If Applicable): No results found for: PREGTESTUR, PREGSERUM, HCG, HCGQUANT     ABGs: No results found for: PHART, PO2ART, DFJ5ZVP, IRN5UAY, BEART, A7AWEFOW     Type & Screen (If Applicable):  No results found for: LABABO, LABRH    Drug/Infectious Status (If Applicable):  No results found for: HIV, HEPCAB    COVID-19 Screening (If Applicable):   Lab Results   Component Value Date    COVID19 NOT DETECTED 04/15/2021           Anesthesia Evaluation  Patient summary reviewed  Airway: Mallampati: II  TM distance: >3 FB   Neck ROM: full  Mouth opening: > = 3 FB Dental:          Pulmonary:normal exam    (+) pneumonia:  current smoker                           Cardiovascular:    (+) hypertension:, valvular problems/murmurs: AI, pacemaker: pacemaker, dysrhythmias: atrial fibrillation, hyperlipidemia               ROS comment: Echo: EF50-55%,moderate aortic regurg,mild to moderate pulmonary HTN, moderate tricusid regurg    SSS (sick sinus syndrome) ([address]     Neuro/Psych:   (+) TIA (Came to ER for evaluation - no residual effects per pt 2/18/2020-\"I lost feeling in my left side- lasted a couple of hours\"),             GI/Hepatic/Renal:            ROS comment: Bladder cancer : per old chart hx bladder CA in 2012- : bladder tumor removed 04/9/2021 skin cancer removed left leg 2021. Endo/Other:    (+) blood dyscrasia: anticoagulation therapy:., .                  ROS comment: [place] Abdominal:             Vascular: Other Findings:           Anesthesia Plan      general     ASA 3       Induction: intravenous. MIPS: Postoperative opioids intended and Prophylactic antiemetics administered. Anesthetic plan and risks discussed with patient. Plan discussed with CRNA. Attending anesthesiologist reviewed and agrees with Pre Eval content       Pre Anesthesia Assessment complete.  Chart reviewed on 8/20/2021      SHAYAN Neves - CRNA   8/20/2021

## 2021-08-23 ENCOUNTER — ANESTHESIA (OUTPATIENT)
Dept: OPERATING ROOM | Age: 81
End: 2021-08-23
Payer: MEDICARE

## 2021-08-23 ENCOUNTER — HOSPITAL ENCOUNTER (OUTPATIENT)
Age: 81
Setting detail: OUTPATIENT SURGERY
Discharge: HOME | End: 2021-08-23
Attending: UROLOGY | Admitting: UROLOGY
Payer: MEDICARE

## 2021-08-23 VITALS
DIASTOLIC BLOOD PRESSURE: 57 MMHG | SYSTOLIC BLOOD PRESSURE: 104 MMHG | TEMPERATURE: 98.6 F | OXYGEN SATURATION: 100 % | RESPIRATION RATE: 5 BRPM

## 2021-08-23 VITALS
DIASTOLIC BLOOD PRESSURE: 69 MMHG | BODY MASS INDEX: 15.66 KG/M2 | OXYGEN SATURATION: 97 % | SYSTOLIC BLOOD PRESSURE: 148 MMHG | HEART RATE: 73 BPM | RESPIRATION RATE: 16 BRPM | HEIGHT: 65 IN | TEMPERATURE: 97.2 F | WEIGHT: 94 LBS

## 2021-08-23 DIAGNOSIS — C67.9 MALIGNANT NEOPLASM OF URINARY BLADDER, UNSPECIFIED SITE (HCC): ICD-10-CM

## 2021-08-23 PROCEDURE — 7100000010 HC PHASE II RECOVERY - FIRST 15 MIN: Performed by: UROLOGY

## 2021-08-23 PROCEDURE — 3700000000 HC ANESTHESIA ATTENDED CARE: Performed by: UROLOGY

## 2021-08-23 PROCEDURE — 2580000003 HC RX 258: Performed by: NURSE ANESTHETIST, CERTIFIED REGISTERED

## 2021-08-23 PROCEDURE — 2709999900 HC NON-CHARGEABLE SUPPLY: Performed by: UROLOGY

## 2021-08-23 PROCEDURE — 88341 IMHCHEM/IMCYTCHM EA ADD ANTB: CPT | Performed by: PATHOLOGY

## 2021-08-23 PROCEDURE — 88342 IMHCHEM/IMCYTCHM 1ST ANTB: CPT | Performed by: PATHOLOGY

## 2021-08-23 PROCEDURE — 3600000003 HC SURGERY LEVEL 3 BASE: Performed by: UROLOGY

## 2021-08-23 PROCEDURE — 3600000013 HC SURGERY LEVEL 3 ADDTL 15MIN: Performed by: UROLOGY

## 2021-08-23 PROCEDURE — 6360000002 HC RX W HCPCS: Performed by: UROLOGY

## 2021-08-23 PROCEDURE — 3700000001 HC ADD 15 MINUTES (ANESTHESIA): Performed by: UROLOGY

## 2021-08-23 PROCEDURE — 88307 TISSUE EXAM BY PATHOLOGIST: CPT | Performed by: PATHOLOGY

## 2021-08-23 PROCEDURE — 7100000011 HC PHASE II RECOVERY - ADDTL 15 MIN: Performed by: UROLOGY

## 2021-08-23 PROCEDURE — 7100000001 HC PACU RECOVERY - ADDTL 15 MIN: Performed by: UROLOGY

## 2021-08-23 PROCEDURE — 6360000002 HC RX W HCPCS: Performed by: NURSE ANESTHETIST, CERTIFIED REGISTERED

## 2021-08-23 PROCEDURE — 7100000000 HC PACU RECOVERY - FIRST 15 MIN: Performed by: UROLOGY

## 2021-08-23 RX ORDER — HYDROMORPHONE HCL 110MG/55ML
0.5 PATIENT CONTROLLED ANALGESIA SYRINGE INTRAVENOUS EVERY 5 MIN PRN
Status: DISCONTINUED | OUTPATIENT
Start: 2021-08-23 | End: 2021-08-23 | Stop reason: HOSPADM

## 2021-08-23 RX ORDER — LIDOCAINE HYDROCHLORIDE 20 MG/ML
INJECTION, SOLUTION INTRAVENOUS PRN
Status: DISCONTINUED | OUTPATIENT
Start: 2021-08-23 | End: 2021-08-23 | Stop reason: SDUPTHER

## 2021-08-23 RX ORDER — ONDANSETRON 2 MG/ML
INJECTION INTRAMUSCULAR; INTRAVENOUS PRN
Status: DISCONTINUED | OUTPATIENT
Start: 2021-08-23 | End: 2021-08-23 | Stop reason: SDUPTHER

## 2021-08-23 RX ORDER — CEFAZOLIN SODIUM 2 G/100ML
2000 INJECTION, SOLUTION INTRAVENOUS ONCE
Status: COMPLETED | OUTPATIENT
Start: 2021-08-23 | End: 2021-08-23

## 2021-08-23 RX ORDER — LABETALOL HYDROCHLORIDE 5 MG/ML
5 INJECTION, SOLUTION INTRAVENOUS EVERY 10 MIN PRN
Status: DISCONTINUED | OUTPATIENT
Start: 2021-08-23 | End: 2021-08-23 | Stop reason: HOSPADM

## 2021-08-23 RX ORDER — HYDROCODONE BITARTRATE AND ACETAMINOPHEN 5; 325 MG/1; MG/1
2 TABLET ORAL EVERY 6 HOURS PRN
Status: DISCONTINUED | OUTPATIENT
Start: 2021-08-23 | End: 2021-08-23 | Stop reason: HOSPADM

## 2021-08-23 RX ORDER — PROMETHAZINE HYDROCHLORIDE 25 MG/ML
6.25 INJECTION, SOLUTION INTRAMUSCULAR; INTRAVENOUS
Status: DISCONTINUED | OUTPATIENT
Start: 2021-08-23 | End: 2021-08-23 | Stop reason: HOSPADM

## 2021-08-23 RX ORDER — MEPERIDINE HYDROCHLORIDE 25 MG/ML
12.5 INJECTION INTRAMUSCULAR; INTRAVENOUS; SUBCUTANEOUS EVERY 5 MIN PRN
Status: DISCONTINUED | OUTPATIENT
Start: 2021-08-23 | End: 2021-08-23 | Stop reason: HOSPADM

## 2021-08-23 RX ORDER — ONDANSETRON 2 MG/ML
4 INJECTION INTRAMUSCULAR; INTRAVENOUS
Status: DISCONTINUED | OUTPATIENT
Start: 2021-08-23 | End: 2021-08-23 | Stop reason: HOSPADM

## 2021-08-23 RX ORDER — HYDROCODONE BITARTRATE AND ACETAMINOPHEN 5; 325 MG/1; MG/1
1 TABLET ORAL PRN
Status: DISCONTINUED | OUTPATIENT
Start: 2021-08-23 | End: 2021-08-23 | Stop reason: HOSPADM

## 2021-08-23 RX ORDER — DIPHENHYDRAMINE HYDROCHLORIDE 50 MG/ML
12.5 INJECTION INTRAMUSCULAR; INTRAVENOUS
Status: DISCONTINUED | OUTPATIENT
Start: 2021-08-23 | End: 2021-08-23 | Stop reason: HOSPADM

## 2021-08-23 RX ORDER — 0.9 % SODIUM CHLORIDE 0.9 %
500 INTRAVENOUS SOLUTION INTRAVENOUS
Status: DISCONTINUED | OUTPATIENT
Start: 2021-08-23 | End: 2021-08-23 | Stop reason: HOSPADM

## 2021-08-23 RX ORDER — FENTANYL CITRATE 50 UG/ML
INJECTION, SOLUTION INTRAMUSCULAR; INTRAVENOUS PRN
Status: DISCONTINUED | OUTPATIENT
Start: 2021-08-23 | End: 2021-08-23 | Stop reason: SDUPTHER

## 2021-08-23 RX ORDER — FUROSEMIDE 20 MG/1
TABLET ORAL
Qty: 90 TABLET | Refills: 1 | Status: SHIPPED | OUTPATIENT
Start: 2021-08-23

## 2021-08-23 RX ORDER — HYDRALAZINE HYDROCHLORIDE 20 MG/ML
5 INJECTION INTRAMUSCULAR; INTRAVENOUS EVERY 10 MIN PRN
Status: DISCONTINUED | OUTPATIENT
Start: 2021-08-23 | End: 2021-08-23 | Stop reason: HOSPADM

## 2021-08-23 RX ORDER — PROPOFOL 10 MG/ML
INJECTION, EMULSION INTRAVENOUS PRN
Status: DISCONTINUED | OUTPATIENT
Start: 2021-08-23 | End: 2021-08-23 | Stop reason: SDUPTHER

## 2021-08-23 RX ORDER — SODIUM CHLORIDE, SODIUM LACTATE, POTASSIUM CHLORIDE, CALCIUM CHLORIDE 600; 310; 30; 20 MG/100ML; MG/100ML; MG/100ML; MG/100ML
INJECTION, SOLUTION INTRAVENOUS CONTINUOUS PRN
Status: DISCONTINUED | OUTPATIENT
Start: 2021-08-23 | End: 2021-08-23 | Stop reason: SDUPTHER

## 2021-08-23 RX ORDER — FENTANYL CITRATE 50 UG/ML
50 INJECTION, SOLUTION INTRAMUSCULAR; INTRAVENOUS EVERY 5 MIN PRN
Status: DISCONTINUED | OUTPATIENT
Start: 2021-08-23 | End: 2021-08-23 | Stop reason: HOSPADM

## 2021-08-23 RX ORDER — DEXAMETHASONE SODIUM PHOSPHATE 4 MG/ML
INJECTION, SOLUTION INTRA-ARTICULAR; INTRALESIONAL; INTRAMUSCULAR; INTRAVENOUS; SOFT TISSUE PRN
Status: DISCONTINUED | OUTPATIENT
Start: 2021-08-23 | End: 2021-08-23 | Stop reason: SDUPTHER

## 2021-08-23 RX ADMIN — SODIUM CHLORIDE, POTASSIUM CHLORIDE, SODIUM LACTATE AND CALCIUM CHLORIDE: 600; 310; 30; 20 INJECTION, SOLUTION INTRAVENOUS at 11:44

## 2021-08-23 RX ADMIN — PROPOFOL 50 MG: 10 INJECTION, EMULSION INTRAVENOUS at 11:46

## 2021-08-23 RX ADMIN — PROPOFOL 50 MG: 10 INJECTION, EMULSION INTRAVENOUS at 11:45

## 2021-08-23 RX ADMIN — ONDANSETRON 4 MG: 2 INJECTION INTRAMUSCULAR; INTRAVENOUS at 11:50

## 2021-08-23 RX ADMIN — FENTANYL CITRATE 50 MCG: 50 INJECTION, SOLUTION INTRAMUSCULAR; INTRAVENOUS at 12:08

## 2021-08-23 RX ADMIN — CEFAZOLIN SODIUM 2000 MG: 2 INJECTION, SOLUTION INTRAVENOUS at 11:50

## 2021-08-23 RX ADMIN — LIDOCAINE HYDROCHLORIDE 50 MG: 20 INJECTION, SOLUTION INTRAVENOUS at 11:45

## 2021-08-23 RX ADMIN — DEXAMETHASONE SODIUM PHOSPHATE 4 MG: 4 INJECTION, SOLUTION INTRAMUSCULAR; INTRAVENOUS at 11:50

## 2021-08-23 RX ADMIN — FENTANYL CITRATE 50 MCG: 50 INJECTION, SOLUTION INTRAMUSCULAR; INTRAVENOUS at 12:00

## 2021-08-23 ASSESSMENT — PULMONARY FUNCTION TESTS
PIF_VALUE: 3
PIF_VALUE: 2
PIF_VALUE: 0
PIF_VALUE: 2
PIF_VALUE: 2
PIF_VALUE: 1
PIF_VALUE: 2
PIF_VALUE: 1
PIF_VALUE: 2
PIF_VALUE: 3
PIF_VALUE: 4
PIF_VALUE: 2
PIF_VALUE: 3
PIF_VALUE: 1
PIF_VALUE: 2
PIF_VALUE: 3
PIF_VALUE: 2
PIF_VALUE: 2
PIF_VALUE: 3
PIF_VALUE: 2
PIF_VALUE: 2
PIF_VALUE: 3
PIF_VALUE: 2
PIF_VALUE: 18
PIF_VALUE: 2
PIF_VALUE: 9
PIF_VALUE: 2
PIF_VALUE: 2
PIF_VALUE: 1
PIF_VALUE: 1
PIF_VALUE: 2
PIF_VALUE: 2

## 2021-08-23 ASSESSMENT — PAIN SCALES - GENERAL
PAINLEVEL_OUTOF10: 0
PAINLEVEL_OUTOF10: 0

## 2021-08-23 ASSESSMENT — PAIN - FUNCTIONAL ASSESSMENT: PAIN_FUNCTIONAL_ASSESSMENT: 0-10

## 2021-08-23 NOTE — H&P
Department of Urology   pre op H&P  [facility]    Date: 2021   Patient: Dionicio Jurado   : 1940   DOA: 2021   MRN: [id number]   ROOM#: OR/NONE       CHIEF COMPLAINT:  Bladder cancer    History Obtained From:  patient, electronic medical record    HISTORY OF PRESENT ILLNESS:                The patient is a 79 y.o. 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 She has declined referral for a second opinion. She presents for cystoscopy with TURBT. She is chronically on Coumadin for atrial fibrillation. Past Medical History:        Diagnosis Date    Abnormal urine     \"surgry cancelled on 2020- had abnormal urine so put me on antibiotics and did another urine at office 2020- have not heard if ok yet\"    Arthritis     Left hand    Atrial fibrillation (HCC)     **Coumadin Clinic follows PT/INRs,along w/prescribing Coumadin. ** - Dr. Juan Noriega Bladder infection     \"started on antibiotic 2021 for this\"    Bladder tumor     \"for surgery 2021 for this\"    Cancer [facility])     per old chart hx bladder CA in - , skin cancer removed left leg     H/O echocardiogram 2020    EF 55-60%, Severe AR &TR, Mild-Mod PHTN.    H/O transesophageal echocardiography (VEENA) for monitoring 10/15/2020    Moderately dilated right atrium. EF 50-55%, Severe AR, Mod to severe TR.    [place] (hard of hearing)     emily  no hearing aides    Hx of echocardiogram 2017    EF50-55%,moderate aortic regurg,mild to moderate pulmonary HTN, moderate tricusid regurg    Hyperlipidemia     Hypertension     [facility]s with [facility] Mk Adames (sick [facility]     TIA (transient ischemic attack) 2012    Came to ER for evaluation - no residual effects per pt 2020-\"I lost feeling in my left side- lasted a couple of hours\"    Wears glasses     to read       Past Surgical History: Procedure Laterality Date    APPENDECTOMY  1960    BLADDER SURGERY  2016    cyst removed @ [address] [facility]  ?? Normal exam per pt - Dr. Norman Pierce 2020    CYSTOSCOPY TRANSURETHRAL RESECTION BLADDER performed by Michelle Gay MD at [address] 2021    CYSTOSCOPY TRANSURETHRAL RESECTION BLADDER performed by Michelle Gay MD at [facility]    collasped lung    PACEMAKER INSERTION  10/08/2008    medtronic - dr. Daisha Ramirez  (new battery) 2018?  PACEMAKER PLACEMENT      TUBAL LIGATION         Current Medications:   Current Facility-Administered Medications: ceFAZolin (ANCEF) 2000 mg in dextrose 4 % 100 mL IVPB (premix), 2,000 mg, Intravenous, Once    Allergies:  Simvastatin    Social History:   TOBACCO:   reports that she has been smoking cigarettes. She started smoking about 68 years ago. She has a 33.50 pack-year smoking history. She has never used smokeless tobacco.  ETOH:   reports no history of alcohol use. DRUGS:   reports no history of drug use. MARITAL STATUS:    OCCUPATION:      Family History:       Problem Relation Age of Onset    High Cholesterol Mother     Cancer Mother         \"cancer in her stomach- lymphoma\"    Diabetes Father     Heart Disease Brother        REVIEW OF SYSTEMS:  GENITOURINARY:  positive for dysuria and hematuria    PHYSICAL EXAM:    VITALS:  /75   Pulse 76   Temp 97.4 °F (36.3 °C) (Temporal)   Resp 14   Ht 5' 5\" (1.651 m)   Wt 94 lb (42.6 kg)   SpO2 97%   BMI 15.64 kg/m²   TEMPERATURE:  Current - Temp: 97.4 °F (36.3 °C); Max - Temp  Av.4 °F (36.3 °C)  Min: 97.4 °F (36.3 °C)  Max: 97.4 °F (36.3 °C)    24HR BLOOD PRESSURE RANGE:  Systolic (18XEU), ATW:291 , Min:135 , RXA:097   ; Diastolic (92WWB), KAV:98, Min:75, Max:75      8HR INTAKE OUTPUT:  No intake/output data recorded.     URINARY CATHETER OUTPUT ([place]):     DRAIN/TUBE OUTPUT:        CONSTITUTIONAL:  awake, alert, cooperative, no apparent distress, and appears stated age. Thin,    EYES:  Lids and lashes normal, pupils equal  NECK:  supple, symmetrical, trachea midline and skin normal  BACK:  Symmetric, no curvature, spinous processes are non-tender on palpation, paraspinous muscles are non-tender on palpation, no costal vertebral tenderness  LUNGS:  No increased work of breathing  ABDOMEN:  No scars, normal bowel sounds, soft, non-distended, non-tender, no masses palpated, no hepatosplenomegally  BMI 15.6    Data:    WBC:    Lab Results   Component Value Date    WBC 6.8 04/19/2021     Hemoglobin/Hematocrit:    Lab Results   Component Value Date    HGB 14.7 04/19/2021    HCT 45.4 04/19/2021     BMP:    Lab Results   Component Value Date     03/08/2021    K 4.0 03/08/2021     03/08/2021    CO2 29 03/08/2021    BUN 16 03/08/2021    LABALBU 4.5 05/27/2020    CREATININE 0.9 03/08/2021    CALCIUM 9.1 03/08/2021    GFRAA >60 03/08/2021    LABGLOM >60 03/08/2021     PT/INR:    Lab Results   Component Value Date    PROTIME 24.6 08/09/2021    INR 2.0 08/09/2021    INR 2.0 08/09/2021    INR 1.01 04/19/2021           Imaging:    XR CHEST (2 VW)    Result Date: 8/9/2021  EXAMINATION: TWO XRAY VIEWS OF THE CHEST 8/9/2021 11:25 am COMPARISON: 02/26/2021 HISTORY: ORDERING SYSTEM PROVIDED HISTORY: Chronic diastolic congestive heart failure [facility]) TECHNOLOGIST PROVIDED HISTORY: Reason for exam:->check for CHF preoperatively. Reason for Exam: chronic congestive heart failure; pre-op;  check for [facility]? F preoperatively Acuity: Chronic Type of Exam: Ongoing Additional signs and symptoms: patient states bad heart valves;  patient states pre op for bladder surgery Relevant Medical/Surgical History: patient states bad heart valves;  patient states pre op for bladder surgery FINDINGS: Left chest wall cardiac device present. Heart size and mediastinal contours are stable. Thoracic aortic atherosclerotic disease.   Hyperinflated lungs with flattening of the diaphragm. The upper lungs are hyperlucent. No focal consolidation, pleural effusion, or pneumothorax. Biapical pleuroparenchymal scars. Stable chest.  No acute cardiopulmonary abnormality. The COPD and emphysema. Assessment/Plan: 80 yo female with h/o high grade T1 TCC bladder here for TURBT today. She is NPO. All questions answered, will proceed. We discussed a hospice referral which she will think on further & let me know how to assist if appropriate. Patient seen and examined, chart reviewed.      Andreia Kennedy MD     Electronically signed at 8/23/2021

## 2021-08-23 NOTE — OP NOTE
Operative Note      Patient: Aylin Santoyo  YOB: 1940  MRN: [id number]    Date of Procedure: 8/23/2021    Pre-Op Diagnosis: Malignant neoplasm of urinary bladder, unspecified site ([facility] [C67.9]    Post-Op Diagnosis: Same       Procedure(s):  CYSTOSCOPY TRANSURETHRAL RESECTION BLADDER TUMOR    Surgeon(s):  Macarena Garza MD    Anesthesia: General    Estimated Blood Loss (mL): less than 50     Complications: None    Specimens: bladder tumor    Implants: none      Drains: none    Findings: bladder mass c/w hG TCC dome, Bladder neck    Detailed Description of Procedure: This patient is a 78 yo female with MMP chronically on Coumadin for atrial fibrillation who is here for a cystoscopy, fulguration and possible TURBT. She has declined more aggressive intervention/treatment than periodic TURBT/fulguration. She has lost a significant amount of weight. We discussed with procedure pre op with her daughter at bedside - the consent form was signed. She was brought to the operative suite and placed in the supine position. Anesthesia was administered and then she was placed in the dorsal lithotomy position and sterilely prepped and draped. I conducted a time out ensuring this was the proper patient/case - all in the room agreed. Rigid cystoscopy ensued. There was a large burden of sessile mass at the dome and the right wall and bladder neck. Total volume was > 4 cm squared. I resected what I could safely and fulgurated the balance remaining. Resected lesions submitted. No catheter placed due to excellent hemostatis. She tolerated the procedure well and w/o issue. She as follow up set for September 7th, 2021 at 1200. She will consider a referral to hospice thereafter.     Electronically signed by Macarena Garza MD on 8/23/2021 at 11:46 AM

## 2021-08-23 NOTE — PROGRESS NOTES
1229 arrived to pacu from OR, monitors applied, alarms on, report received from Nicholas Shafer, and [place], oral airway in, good air exchange noted. 1240 responds oral airway removed, deep breathes with encouragement.     1300 Patient turned and repositioned, 02 removed    1310 Patient transferred to \[facility]\"" via cart, report given at bedside to [facility]
Assisted up to bathroom, voided mod amt pink tinged urine
Discharge instructions given to pt and daughter, both voiced understanding. Instructed to wait until Wednesday to start coumadin. 1425 pt escorted to car via wheelchair for discharge with daughter.
Spoke with pt. Confirming surgery for 8/23/21 at 1100. Arrival time of 0900 given. Pt. States received covid test today and results are negative. Informed her to bring in results on day of surgery and that it must show pt's name, date of birth, when she got the test and the rests. She verbalized understanding. She denied any other questions at this time.
(H & P, Consent, & card for implantable devices). 12. Take a shower the night before or morning of your procedure, do not apply any lotion, oil or powder. 13. Wear a mask covering your nose & mouth when entering the hospital. Have your covid-19 test performed within 10 days of your                  surgery. Quarantine yourself after the test until after your surgery. Pt. Instructed needs to have a covid test prior to surgery.  She states will speak with her 's office about getting covid test.

## 2021-08-24 ENCOUNTER — TELEPHONE (OUTPATIENT)
Dept: CARDIOLOGY CLINIC | Age: 81
End: 2021-08-24

## 2021-08-24 DIAGNOSIS — I48.0 PAF (PAROXYSMAL ATRIAL FIBRILLATION) (HCC): Primary | ICD-10-CM

## 2021-08-24 NOTE — ANESTHESIA POSTPROCEDURE EVALUATION
Department of Anesthesiology  Postprocedure Note    Patient: Duong Monterroso  MRN: [id number]  YOB: 1940  Date of evaluation: 8/24/2021  Time:  12:42 PM     Procedure Summary     Date: 08/23/21 Room / Location: [place] [address] / [facility]    Anesthesia Start: 3780 Anesthesia Stop: 2212    Procedure: CYSTOSCOPY TRANSURETHRAL RESECTION BLADDER TUMOR (N/A Ureter) Diagnosis:       Malignant neoplasm of urinary bladder, unspecified site ([facility]      (Malignant neoplasm of urinary bladder, unspecified site [facility]) [C67.9])    Surgeons: Holland Montalvo MD Responsible Provider: Charissa Dance, MD    Anesthesia Type: general ASA Status: 3          Anesthesia Type: general    Paul Phase I: Paul Score: 10    Paul Phase II: Paul Score: 10    Last vitals: Reviewed and per EMR flowsheets.        Anesthesia Post Evaluation    Patient location during evaluation: PACU  Patient participation: complete - patient participated  Level of consciousness: awake  Pain score: 3  Airway patency: patent  Nausea & Vomiting: no vomiting and no nausea  Complications: no  Cardiovascular status: blood pressure returned to baseline and hemodynamically stable  Respiratory status: acceptable  Hydration status: stable

## 2021-09-01 ENCOUNTER — OFFICE VISIT (OUTPATIENT)
Dept: CARDIOLOGY CLINIC | Age: 81
End: 2021-09-01
Payer: MEDICARE

## 2021-09-01 VITALS
OXYGEN SATURATION: 99 % | DIASTOLIC BLOOD PRESSURE: 78 MMHG | BODY MASS INDEX: 15.73 KG/M2 | SYSTOLIC BLOOD PRESSURE: 130 MMHG | WEIGHT: 94.4 LBS | HEIGHT: 65 IN | HEART RATE: 62 BPM

## 2021-09-01 DIAGNOSIS — I50.32 CHRONIC DIASTOLIC CONGESTIVE HEART FAILURE (HCC): ICD-10-CM

## 2021-09-01 DIAGNOSIS — C67.9 MALIGNANT NEOPLASM OF URINARY BLADDER, UNSPECIFIED SITE (HCC): Primary | ICD-10-CM

## 2021-09-01 PROCEDURE — G8427 DOCREV CUR MEDS BY ELIG CLIN: HCPCS | Performed by: NURSE PRACTITIONER

## 2021-09-01 PROCEDURE — 99214 OFFICE O/P EST MOD 30 MIN: CPT | Performed by: NURSE PRACTITIONER

## 2021-09-01 PROCEDURE — 4040F PNEUMOC VAC/ADMIN/RCVD: CPT | Performed by: NURSE PRACTITIONER

## 2021-09-01 PROCEDURE — 1090F PRES/ABSN URINE INCON ASSESS: CPT | Performed by: NURSE PRACTITIONER

## 2021-09-01 PROCEDURE — 1123F ACP DISCUSS/DSCN MKR DOCD: CPT | Performed by: NURSE PRACTITIONER

## 2021-09-01 PROCEDURE — 4004F PT TOBACCO SCREEN RCVD TLK: CPT | Performed by: NURSE PRACTITIONER

## 2021-09-01 PROCEDURE — G8419 CALC BMI OUT NRM PARAM NOF/U: HCPCS | Performed by: NURSE PRACTITIONER

## 2021-09-01 PROCEDURE — G8400 PT W/DXA NO RESULTS DOC: HCPCS | Performed by: NURSE PRACTITIONER

## 2021-09-01 NOTE — LETTER
Abebe Saenz  1940  [id number]    Have you had any Chest Pain that is not new? - No  If Yes DO EKG - How does it feel -    How long does the pain last -      How long have you been having the pain -    Did you take a    And did it relieve the pain -      DO EKG IF: Patient has a Heart Rate above 100 or below 40     CAD (Coronary Artery Disease) patient should have one on file every 6 months        Have you had any Shortness of Breath - Yes  If Yes - When on exertion    Have you had any dizziness - No  If Yes DO ORTHOSTATIC BP - when do you feel dizzy    How long does it last .        Sitting wait 5 minutes do supine (laying down) wait 5 minutes then do standing - log each in \"vitals\" area in Epic   Be sure to ask what symptoms they are having if they get dizzy while completing ortho stats such as: room spinning, nausea, etc.    Have you had any palpitations that are not new? - No  If Yes DO EKG - Do you feel your heart   How long does it last - . Is the patient on any of the following medications -   If Yes DO EKG - Needs done every 6 months    Do you have any edema - swelling in No    If Yes - CHECK TO SEE IF THE EDEMA IS PITTING  How long have they been having edema -   If Yes - Have they worn compression stockings   If they have worn compression stockings      Vein \"LEG PROBLEM Questionnaire\"  1. Do you have prominent leg veins? 2. Do you have any skin discoloration? 3. Do you have any healed or active sores? 4. Do you have swelling of the legs? 5. Do you have a family history of varicose veins? 6. Does your profession involve pro-longed        standing or heavy lifting? 7. Have you been fighting overweight problems? 8. Do you have restless legs? 9. Do you have any night time cramps?     10. Do you have any of the following in your legs:             When did you have your last labs drawn   Where did you have them done   What doctor ordered     If we do not have these labs you are retrieve these labs for these providers! Do you have a surgery or procedure scheduled in the near future - No  If Yes- DO EKG  If Yes - Who is the surgery with?    Phone number of physician   Fax number of physician   Type of surgery   GIVE THIS INFORMATION TO INDRA DAVID     Ask patient if they want to sign up for MyChart if they are not already signed up     Check to see if we have an E-MAIL on file for the patient     Check medication list thoroughly!!! AND RECONCILE OUTSIDE MEDICATIONS  If dose has changed change the entire order not just the [place] At check out add to every patient's \"wrap up\" the following dot phrase AFTERHOURSEDUCATION and ensure we explain this to our patients

## 2021-09-01 NOTE — PROGRESS NOTES
[facility] [place] [facility] [place]  Fawn Licona  Phone: [phone]    Fax [phone]    Ceferino Finley MD, Chris Boothe MD, Jannice Cooks, MD, MD Ashutosh Han MD Marla Slipper, MD Loreda Lacer, MD Marsha Martinez, APRFRED Hoff, APRFRED [place], [facility], [facility]    CARDIOLOGY  NOTE    2021    Zohreh Collins (:  1940) is a 79 y.o. female,Established patient with Dr. Becca Sanders, here for evaluation of the following chief complaint(s):  Follow-up and Results    SUBJECTIVE/OBJECTIVE:      Patient is her to follow up on the following:  No diagnosis found. Patient had bladder surgery earlier this year with Dr. Harshad Jacobsen. Patient has been having more issues feeling weak and UTI's. She is getting worse and was found that the bladder cancer is back again. She is having intermittent PND, that seems to resolve without significant changes in her routine. She does note that she is not eating or drinking as much after the episode. She has a recent UTI, few weeks, she has been weaker and her daughter had to come live with her to help take care of the house and her  whom has Alzheimer's. She had her surgery with Dr. Sheron Dandy a little over a week ago. She reports that the cancer is back and that she is going to speaking with hospice to establish care and goal setting. Patient is a smoker. Patient denies issues obtaining or taking medications. Patient is active and does not do organized exercise. Patient denies chest pain, shortness of breath, palpitations, dizziness, orthopnea, lower leg swelling, or syncope. Review of Systems   Constitutional: Positive for fatigue. Negative for fever. Respiratory: Negative for cough and shortness of breath. Cardiovascular: Negative for chest pain leg swelling and palpitations.    Gastrointestinal:   Musculoskeletal: Negative for arthralgias abdominal distention and gait problem. Neurological: Negative for dizziness, syncope, weakness, light-headedness and headaches. Psychiatric/Behavioral: Positive for sleep disturbance. Vitals:    09/01/21 0922   BP: 130/78   Pulse: 62   SpO2: 99%   Weight: 94 lb 6.4 oz (42.8 kg)   Height: 5' 5\" (1.651 m)       Wt Readings from Last 3 Encounters:   09/01/21 94 lb 6.4 oz (42.8 kg)   08/18/21 94 lb (42.6 kg)   08/04/21 94 lb 6.4 oz (42.8 kg)       BP Readings from Last 3 Encounters:   09/01/21 130/78   08/23/21 (!) 104/57   08/23/21 (!) 148/69       All medications reviewed and confirmed with patient. Physical Exam  Vitals reviewed. Constitutional:       General: She is not in acute distress. Appearance: Normal appearance. She is not ill-appearing. HENT:      Head: Atraumatic. Neck:      Vascular: No carotid bruit. Cardiovascular:      Rate and Rhythm: Normal rate and regular rhythm. Pulses: Normal pulses. Heart sounds: Normal heart sounds. No murmur heard. Pulmonary:      Effort: Pulmonary effort is normal. No respiratory distress. Breath sounds: Rales present. Musculoskeletal:         General: No swelling or deformity. Cervical back: Neck supple. No muscular tenderness. Neurological:      Mental Status: She is alert.              Health Maintenance   Topic Date Due    Hepatitis C screen  Never done    DTaP/Tdap/Td vaccine (1 - Tdap) Never done    Shingles Vaccine (1 of 2) Never done    DEXA (modify frequency per FRAX score)  Never done    Pneumococcal 65+ years Vaccine (1 of 1 - PPSV23) Never done    Low dose CT lung screening  08/06/2013    Annual Wellness Visit (AWV)  Never done    Lipid screen  05/27/2021    Flu vaccine (1) Never done    Potassium monitoring  03/08/2022    Creatinine monitoring  03/08/2022    COVID-19 Vaccine  Completed    Hepatitis A vaccine  Aged Out    Hepatitis B vaccine  Aged Out    Hib vaccine  Aged Out    Meningococcal (ACWY) vaccine Aged Out     Notes reviewed: Dr. Rafy Flores 19, 2021  Dr. Leilani Johnson 27, 2021    ASSESSMENT/PLAN:    1. Chronic atrial fibrillation (HCC)  Assessment & Plan:  Continues to have atrial fibrillation, Rate is well controlled  Continue coumadin for [facility]   Continue digoxin, and metoprolol for rate control  2. Nonrheumatic aortic valve insufficiency  Assessment & Plan:  · Patient is having  symptoms currently, but she does not want additional investigation  · Low salt diet advised  3. Shortness of breath  · Worse than last OV  · Rales noted  · Take lasix today and tomorrow. · She is more weak today than she was at last OV. Daughter states that she just gets so tired. 4.  Heart failure preserved ejection fraction  · Appears a little overloaded. · take lasix today and tomorrow  · Discussed HF program through [facility] that she would qualify for and benefit from. She states she would like to talk to someone. Referral sent. 5. PPM  She does not want to have her PPM checked again. Will notify PPM team.     Return if symptoms worsen or fail to improve. An electronic signature was used to authenticate this note.     Electronically signed by SHAYAN Barrientos CNP on 9/1/2021 at 10:29 AM

## 2021-09-01 NOTE — PATIENT INSTRUCTIONS
**It is YOUR responsibilty to bring medication bottles and/or updated medication list to [address] [place] [address]. This will allow us to better serve you and all your healthcare needs**    Please be informed that if you contact our office outside of normal business hours the physician on call cannot help with any scheduling or rescheduling issues, procedure instruction questions or any type of medication issue. We advise you for any urgent/emergency that you go to the nearest emergency room!     PLEASE CALL OUR OFFICE DURING NORMAL BUSINESS HOURS    Monday - Friday   8 am to 5 pm    [place]: [place] [address]: [phone]    [place]:  [phone]

## 2021-09-02 ENCOUNTER — ANTI-COAG VISIT (OUTPATIENT)
Dept: PHARMACY | Age: 81
End: 2021-09-02
Payer: MEDICARE

## 2021-09-02 DIAGNOSIS — I48.20 CHRONIC ATRIAL FIBRILLATION (HCC): Primary | ICD-10-CM

## 2021-09-02 DIAGNOSIS — G45.9 TIA (TRANSIENT ISCHEMIC ATTACK): ICD-10-CM

## 2021-09-02 LAB
INTERNATIONAL NORMALIZATION RATIO, POC: 1.4
POC INR: 1.4 INDEX
PROTHROMBIN TIME, POC: 16.8 SECONDS (ref 10–14.3)

## 2021-09-02 PROCEDURE — 99212 OFFICE O/P EST SF 10 MIN: CPT

## 2021-09-02 PROCEDURE — 36416 COLLJ CAPILLARY BLOOD SPEC: CPT

## 2021-09-02 PROCEDURE — 85610 PROTHROMBIN TIME: CPT

## 2021-09-02 NOTE — PROGRESS NOTES
Medication Management Service  [place] [facility]  [phone]    Visit Date: 9/2/2021   Subjective:       Maki Ware is a 79 y.o. female who presents to clinic today for anticoagulation monitoring and adjustment. Patient seen in clinic for warfarin management due to  Indication:   atrial fibrillation. INR goal: of 2.0-3.0. Duration of therapy: indefinite. Patient reports the following:   Adherent with regimen  Missed or extra doses:  None   Bleeding or thromboembolic side effects:  None  Significant medication changes:  None  Significant dietary changes: None  Significant alcohol or tobacco changes: None  Significant recent illness, disease state changes, or hospitalization:  None  Upcoming surgeries or procedures:  None  Falls: None           Assessment and Plan     PT/INR done in office per protocol. INR today is 1.4, subtherapeutic. Warfarin was held 5 days prior to surgery 8/23 and restarted immediately after. She reports appetite increasing and daughter cooking, but continues to lose weight. She is waiting to hear from hospice. Will adjust dose now and schedule back pending hospice plans. Plan:  Take warfarin 7.5mg tomorrow then increase weekly dose ~8% to warfarin 5mg daily  Recheck INR in 2 week(s). Patient verbalized understanding of dosing directions and information discussed. Dosing schedule given to patient including phone number, appointment date, and time. Progress note sent to referring office. Patient acknowledges working in consult agreement with pharmacist as referred by his/her physician.       Electronically signed by Alyssa Do, [address] [place] [address] on 9/2/21 at 11:04 AM EDT    For Pharmacy Admin Tracking Only     Intervention Detail: Dose Adjustment: 1, reason: Therapy Optimization   Total # of Interventions Recommended: 1   Total # of Interventions Accepted: 1   Time Spent (min): 15

## 2021-09-16 ENCOUNTER — TELEPHONE (OUTPATIENT)
Dept: PHARMACY | Age: 81
End: 2021-09-16

## 2021-09-16 NOTE — TELEPHONE ENCOUNTER
Patient left voicemail to cancel appointment, stating a visiting nurse will be checking her INR. Returned call and she is having nurse visits through [facility]. They were not able to check her INR today due to not having machine, but will at next visit next week. Patient unsure if hospice will manage dosing. Advised that results can be called to coumadin clinic.           For Pharmacy Admin Tracking Only     Intervention Detail:    Total # of Interventions Recommended:    Total # of Interventions Accepted:    Time Spent (min): 5

## 2021-09-23 ENCOUNTER — TELEPHONE (OUTPATIENT)
Dept: PHARMACY | Age: 81
End: 2021-09-23

## 2021-09-23 DIAGNOSIS — G45.9 TIA (TRANSIENT ISCHEMIC ATTACK): ICD-10-CM

## 2021-09-23 DIAGNOSIS — I48.20 CHRONIC ATRIAL FIBRILLATION (HCC): Primary | ICD-10-CM

## 2021-09-23 DIAGNOSIS — I48.0 PAF (PAROXYSMAL ATRIAL FIBRILLATION) (HCC): Primary | ICD-10-CM

## 2021-09-23 NOTE — TELEPHONE ENCOUNTER
Patient called to report INR was 1.6 with hospice Monday and she was told to take 7.5mg of warfarin daily. Patient voices concern  Because she has never taken a dose that high. Patient provided hospice nurse isDequan Lei at [phone]. Left voicemail for Lali requesting call back. Miguelia returned call and clarified INR was 1.5 on Monday 9/20 and Dr. Rudy Sandoval ordered dose increase to warfarin 7.5mg daily with next INR Monday 9/27. Lali states she will provide feedback on patient's prior dose history, but Dr. Rudy Sandoval plans to manage going forward. Lali will discuss with Dr. Rudy Sandoval and call patient. Will discharge from clinic now that hospice is managing warfarin dosing. Notification sent to Dr. Victoria Pérez.      For Pharmacy Admin Tracking Only     Intervention Detail:    Total # of Interventions Recommended:    Total # of Interventions Accepted:    Time Spent (min): 10

## 2022-01-04 RX ORDER — WARFARIN SODIUM 5 MG/1
TABLET ORAL
Qty: 96 TABLET | Refills: 1 | Status: SHIPPED | OUTPATIENT
Start: 2022-01-04

## (undated) DEVICE — MARKER SURG SKIN UTIL REGULAR/FINE 2 TIP W/ RUL AND 9 LBL

## (undated) DEVICE — CONTAINER,SPECIMEN,OR STERILE,4OZ: Brand: MEDLINE

## (undated) DEVICE — ELECTRODE LOOP 24FR R ANG MPLR CUT

## (undated) DEVICE — TRAY PREP DRY W/ PREM GLV 2 APPL 6 SPNG 2 UNDPD 1 OVERWRAP

## (undated) DEVICE — BAG DRNGE W 8MM ADPT AND SUCT HOSE CYSTO UROLOGICAL FOR

## (undated) DEVICE — ELECTRODE ES AD CRDLSS PT RET REM POLYHESIVE

## (undated) DEVICE — SET IRRIG L94IN ID0.281IN W/ 4.5IN DST FLX CONN 2 LD ON OFF

## (undated) DEVICE — Z INACTIVE USE 2635503 SOLUTION IRRIG 3000ML ST H2O USP UROMATIC PLAS CONT

## (undated) DEVICE — TELFA NON-ADHERENT ABSORBENT DRESSING: Brand: TELFA

## (undated) DEVICE — SYRINGE MED 10ML LUERLOCK TIP W/O SFTY DISP

## (undated) DEVICE — DBD-PACK,CYSTOSCOPY,PK VI,AURORA: Brand: MEDLINE

## (undated) DEVICE — TUBING, SUCTION, 9/32" X 10', STRAIGHT: Brand: MEDLINE

## (undated) DEVICE — SOLUTION IRRIG 3000ML 0.9% SOD CHL USP UROMATIC PLAS CONT

## (undated) DEVICE — GOWN,ECLIPSE,POLYRNF,BRTHSLV,XL,30/CS: Brand: MEDLINE

## (undated) DEVICE — CORD ENDOSCP 10FT

## (undated) DEVICE — CABLE ENDOSCP L10FT ACT DISP

## (undated) DEVICE — CATHETER,FOLEY,3WAY,SILI-ELAST,24FR,30ML: Brand: MEDLINE

## (undated) DEVICE — TOWEL,OR,DSP,ST,BLUE,STD,6/PK,12PK/CS: Brand: MEDLINE

## (undated) DEVICE — DRAINBAG,ANTI-REFLUX TOWER,L/F,2000ML,LL: Brand: MEDLINE

## (undated) DEVICE — YANKAUER,BULB TIP,W/O VENT,RIGID,STERILE: Brand: MEDLINE

## (undated) DEVICE — JELLY,LUBE,STERILE,FLIP TOP,TUBE,2-OZ: Brand: MEDLINE

## (undated) DEVICE — SINGLE PORT MANIFOLD: Brand: NEPTUNE 2

## (undated) DEVICE — 4-PORT MANIFOLD: Brand: NEPTUNE 2

## (undated) DEVICE — MAT FLOOR ULTRA ABS 28X48IN

## (undated) DEVICE — CORD ES L10FT BLU MPLR FT SWCH DISP ACMI

## (undated) DEVICE — SAFESECURE,SECUREMENT,FOLEY CATH,STERILE: Brand: MEDLINE

## (undated) DEVICE — SYRINGE IRRIG 60ML SFT PLIABLE BLB EZ TO GRP 1 HND USE W/

## (undated) DEVICE — CATHETER,FOLEY,100%SILICONE,16FR,10ML,LF: Brand: MEDLINE

## (undated) DEVICE — SYRINGE CATH TIP 50ML

## (undated) DEVICE — SOLUTION IRRIG 3000ML STRL H2O USP UROMATIC PLAS CONT

## (undated) DEVICE — SOLUTION IV IRRIG WATER 1000ML POUR BRL 2F7114

## (undated) DEVICE — GOWN,SIRUS,POLYRNF,BRTHSLV,XLN/XL,20/CS: Brand: MEDLINE

## (undated) DEVICE — GLOVE SURG SZ 65 CRM LTX FREE POLYISOPRENE POLYMER BEAD ANTI

## (undated) DEVICE — UROLOGIC DRAIN BAG: Brand: UNBRANDED